# Patient Record
Sex: MALE | Race: WHITE | Employment: OTHER
[De-identification: names, ages, dates, MRNs, and addresses within clinical notes are randomized per-mention and may not be internally consistent; named-entity substitution may affect disease eponyms.]

---

## 2017-06-21 ENCOUNTER — MYAURORA ACCOUNT LINK (OUTPATIENT)
Dept: OTHER | Age: 72
End: 2017-06-21

## 2017-06-21 ENCOUNTER — PRIOR ORIGINAL RECORDS (OUTPATIENT)
Dept: OTHER | Age: 72
End: 2017-06-21

## 2017-06-22 ENCOUNTER — PRIOR ORIGINAL RECORDS (OUTPATIENT)
Dept: OTHER | Age: 72
End: 2017-06-22

## 2017-06-24 ENCOUNTER — HOSPITAL (OUTPATIENT)
Dept: OTHER | Age: 72
End: 2017-06-24
Attending: INTERNAL MEDICINE

## 2017-06-24 ENCOUNTER — PRIOR ORIGINAL RECORDS (OUTPATIENT)
Dept: OTHER | Age: 72
End: 2017-06-24

## 2017-06-24 LAB
ALBUMIN SERPL-MCNC: 3.8 GM/DL (ref 3.6–5.1)
ALBUMIN/GLOB SERPL: 1 {RATIO} (ref 1–2.4)
ALP SERPL-CCNC: 87 UNIT/L (ref 45–117)
ALT SERPL-CCNC: 40 UNIT/L
ANALYZER ANC (IANC): ABNORMAL
ANION GAP SERPL CALC-SCNC: 11 MMOL/L (ref 10–20)
AST SERPL-CCNC: 25 UNIT/L
BASOPHILS # BLD: 0 THOUSAND/MCL (ref 0–0.3)
BASOPHILS NFR BLD: 1 %
BILIRUB SERPL-MCNC: 1.2 MG/DL (ref 0.2–1)
BUN SERPL-MCNC: 19 MG/DL (ref 6–20)
BUN/CREAT SERPL: 14 (ref 7–25)
CALCIUM SERPL-MCNC: 8.5 MG/DL (ref 8.4–10.2)
CHLORIDE: 106 MMOL/L (ref 98–107)
CHOLEST SERPL-MCNC: 94 MG/DL
CHOLEST/HDLC SERPL: 1.9 {RATIO}
CK SERPL-CCNC: 235 UNIT/L (ref 39–308)
CO2 SERPL-SCNC: 27 MMOL/L (ref 21–32)
CREAT SERPL-MCNC: 1.39 MG/DL (ref 0.67–1.17)
DIFFERENTIAL METHOD BLD: ABNORMAL
EOSINOPHIL # BLD: 0.3 THOUSAND/MCL (ref 0.1–0.5)
EOSINOPHIL NFR BLD: 4 %
ERYTHROCYTE [DISTWIDTH] IN BLOOD: 13.5 % (ref 11–15)
FREE T4: 0.9 NG/DL (ref 0.8–1.5)
GLOBULIN SER-MCNC: 3.8 GM/DL (ref 2–4)
GLUCOSE SERPL-MCNC: 105 MG/DL (ref 65–99)
GLYCOHEMOGLOBIN: 6.1 % (ref 4.5–5.6)
HDLC SERPL-MCNC: 49 MG/DL
HEMATOCRIT: 44.7 % (ref 39–51)
HGB BLD-MCNC: 15.3 GM/DL (ref 13–17)
LDLC SERPL CALC-MCNC: 32 MG/DL
LENGTH OF FAST TIME PATIENT: ABNORMAL HR
LENGTH OF FAST TIME PATIENT: ABNORMAL HR
LYMPHOCYTES # BLD: 1.7 THOUSAND/MCL (ref 1–4)
LYMPHOCYTES NFR BLD: 24 %
MCH RBC QN AUTO: 30.7 PG (ref 26–34)
MCHC RBC AUTO-ENTMCNC: 34.2 GM/DL (ref 32–36.5)
MCV RBC AUTO: 89.6 FL (ref 78–100)
MONOCYTES # BLD: 1 THOUSAND/MCL (ref 0.3–0.9)
MONOCYTES NFR BLD: 14 %
NEUTROPHILS # BLD: 4 THOUSAND/MCL (ref 1.8–7.7)
NEUTROPHILS NFR BLD: 57 %
NEUTS SEG NFR BLD: ABNORMAL %
NONHDLC SERPL-MCNC: 45 MG/DL
PERCENT NRBC: ABNORMAL
PLATELET # BLD: 173 THOUSAND/MCL (ref 140–450)
POTASSIUM SERPL-SCNC: 4.3 MMOL/L (ref 3.4–5.1)
PROT SERPL-MCNC: 7.6 GM/DL (ref 6.4–8.2)
PSA SERPL-MCNC: 0.34 NG/ML
RBC # BLD: 4.99 MILLION/MCL (ref 4.5–5.9)
SODIUM SERPL-SCNC: 140 MMOL/L (ref 135–145)
TRIGLYCERIDE (TRIGP): 63 MG/DL
TSH SERPL-ACNC: 2.54 MCUNIT/ML (ref 0.35–5)
WBC # BLD: 7.1 THOUSAND/MCL (ref 4.2–11)

## 2017-06-26 LAB
ALBUMIN: 3.8 G/DL
ALKALINE PHOSPHATATE(ALK PHOS): 87 IU/L
ALT (SGPT): 40 U/L
ALT (SGPT): 40 U/L
AST (SGOT): 25 U/L
AST (SGOT): 25 U/L
BILIRUBIN TOTAL: 1.2 MG/DL
BUN: 19 MG/DL
CALCIUM: 8.5 MG/DL
CHLORIDE: 106 MEQ/L
CHOLESTEROL, TOTAL: 94 MG/DL
CREATININE KINASE: 235 U/L
CREATININE, SERUM: 1.39 MG/DL
FREE T4: 0.9 MG/DL
GLOBULIN: 3.8 G/DL
GLUCOSE: 105 MG/DL
GLUCOSE: 105 MG/DL
GLYCOHEMOGLOBIN: 6.1 %
HDL CHOLESTEROL: 49 MG/DL
HEMATOCRIT: 44.7 %
HEMOGLOBIN: 15.3 G/DL
LDL CHOLESTEROL: 32 MG/DL
NON-HDL CHOLESTEROL: 45 MG/DL
PLATELETS: 173 K/UL
POTASSIUM, SERUM: 4.3 MEQ/L
PROTEIN, TOTAL: 7.6 G/DL
RED BLOOD COUNT: 4.9 X 10-6/U
SGOT (AST): 25 IU/L
SGPT (ALT): 40 IU/L
SODIUM: 140 MEQ/L
THYROID STIMULATING HORMONE: 2.54 MLU/L
THYROID STIMULATING HORMONE: 2.54 MLU/L
TRIGLYCERIDES: 63 MG/DL
WHITE BLOOD COUNT: 7.1 X 10-3/U

## 2017-06-28 ENCOUNTER — PRIOR ORIGINAL RECORDS (OUTPATIENT)
Dept: OTHER | Age: 72
End: 2017-06-28

## 2018-01-01 ENCOUNTER — PRIOR ORIGINAL RECORDS (OUTPATIENT)
Dept: OTHER | Age: 73
End: 2018-01-01

## 2018-01-01 ENCOUNTER — HOSPITAL (OUTPATIENT)
Dept: OTHER | Age: 73
End: 2018-01-01
Attending: INTERNAL MEDICINE

## 2018-01-01 ENCOUNTER — LAB ENCOUNTER (OUTPATIENT)
Dept: LAB | Age: 73
End: 2018-01-01
Attending: PHYSICAL MEDICINE & REHABILITATION
Payer: MEDICARE

## 2018-01-01 DIAGNOSIS — Z51.81 ENCOUNTER FOR THERAPEUTIC DRUG MONITORING: Primary | ICD-10-CM

## 2018-01-01 LAB
ALBUMIN SERPL-MCNC: 2.5 GM/DL (ref 3.6–5.1)
ALBUMIN/GLOB SERPL: 0.5 {RATIO} (ref 1–2.4)
ALP SERPL-CCNC: 168 UNIT/L (ref 45–117)
ALT SERPL-CCNC: 128 UNIT/L
ANALYZER ANC (IANC): ABNORMAL
ANALYZER ANC (IANC): ABNORMAL
ANION GAP SERPL CALC-SCNC: 14 MMOL/L (ref 10–20)
ANION GAP SERPL CALC-SCNC: 17 MMOL/L (ref 10–20)
AST SERPL-CCNC: 86 UNIT/L
BASOPHILS # BLD: 0 THOUSAND/MCL (ref 0–0.3)
BASOPHILS # BLD: 0.1 THOUSAND/MCL (ref 0–0.3)
BASOPHILS NFR BLD: 0 %
BASOPHILS NFR BLD: 1 %
BILIRUB SERPL-MCNC: 1.2 MG/DL (ref 0.2–1)
BUN SERPL-MCNC: 21 MG/DL (ref 6–20)
BUN SERPL-MCNC: 22 MG/DL (ref 6–20)
BUN/CREAT SERPL: 21 (ref 7–25)
BUN/CREAT SERPL: 22 (ref 7–25)
BURR CELLS (BURC): ABNORMAL
BURR CELLS (BURC): ABNORMAL
CALCIUM SERPL-MCNC: 8.9 MG/DL (ref 8.4–10.2)
CALCIUM SERPL-MCNC: 8.9 MG/DL (ref 8.4–10.2)
CHLORIDE: 102 MMOL/L (ref 98–107)
CHLORIDE: 102 MMOL/L (ref 98–107)
CO2 SERPL-SCNC: 24 MMOL/L (ref 21–32)
CO2 SERPL-SCNC: 24 MMOL/L (ref 21–32)
CREAT SERPL-MCNC: 0.99 MG/DL (ref 0.67–1.17)
CREAT SERPL-MCNC: 1.01 MG/DL (ref 0.67–1.17)
DIFFERENTIAL METHOD BLD: ABNORMAL
DIFFERENTIAL METHOD BLD: ABNORMAL
EOSINOPHIL # BLD: 0.1 THOUSAND/MCL (ref 0.1–0.5)
EOSINOPHIL # BLD: 0.1 THOUSAND/MCL (ref 0.1–0.5)
EOSINOPHIL NFR BLD: 1 %
EOSINOPHIL NFR BLD: 1 %
ERYTHROCYTE [DISTWIDTH] IN BLOOD: 15.5 % (ref 11–15)
ERYTHROCYTE [DISTWIDTH] IN BLOOD: 15.7 % (ref 11–15)
GLOBULIN SER-MCNC: 4.8 GM/DL (ref 2–4)
GLUCOSE BLDC GLUCOMTR-MCNC: 78 MG/DL (ref 65–99)
GLUCOSE BLDC GLUCOMTR-MCNC: 84 MG/DL (ref 65–99)
GLUCOSE BLDC GLUCOMTR-MCNC: 94 MG/DL (ref 65–99)
GLUCOSE BLDC GLUCOMTR-MCNC: 95 MG/DL (ref 65–99)
GLUCOSE BLDC GLUCOMTR-MCNC: 95 MG/DL (ref 65–99)
GLUCOSE BLDC GLUCOMTR-MCNC: NORMAL MG/DL
GLUCOSE SERPL-MCNC: 100 MG/DL (ref 65–99)
GLUCOSE SERPL-MCNC: 117 MG/DL (ref 65–99)
HEMATOCRIT: 37.5 % (ref 39–51)
HEMATOCRIT: 39.6 % (ref 39–51)
HGB BLD-MCNC: 12 GM/DL (ref 13–17)
HGB BLD-MCNC: 12.4 GM/DL (ref 13–17)
INR PPP: 1.1
INR PPP: 1.1
INR PPP: NORMAL
LYMPHOCYTES # BLD: 1 THOUSAND/MCL (ref 1–4)
LYMPHOCYTES # BLD: 1.2 THOUSAND/MCL (ref 1–4)
LYMPHOCYTES NFR BLD: 10 %
LYMPHOCYTES NFR BLD: 15 %
MAGNESIUM SERPL-MCNC: 1.9 MG/DL (ref 1.7–2.4)
MCH RBC QN AUTO: 28 PG (ref 26–34)
MCH RBC QN AUTO: 28.2 PG (ref 26–34)
MCHC RBC AUTO-ENTMCNC: 31.3 GM/DL (ref 32–36.5)
MCHC RBC AUTO-ENTMCNC: 32 GM/DL (ref 32–36.5)
MCV RBC AUTO: 88 FL (ref 78–100)
MCV RBC AUTO: 89.4 FL (ref 78–100)
METAMYELOCYTES NFR BLD: 1 % (ref 0–2)
METAMYELOCYTES NFR BLD: 2 % (ref 0–2)
MONOCYTES # BLD: 0.7 THOUSAND/MCL (ref 0.3–0.9)
MONOCYTES # BLD: 0.8 THOUSAND/MCL (ref 0.3–0.9)
MONOCYTES NFR BLD: 9 %
MONOCYTES NFR BLD: 9 %
MYELOCYTES NFR BLD: 1 %
NEUTROPHILS # BLD: 5.8 THOUSAND/MCL (ref 1.8–7.7)
NEUTROPHILS # BLD: 6.7 THOUSAND/MCL (ref 1.8–7.7)
NEUTS BAND NFR BLD: 2 % (ref 0–10)
NEUTS BAND NFR BLD: 5 % (ref 0–10)
NEUTS SEG NFR BLD: 67 %
NEUTS SEG NFR BLD: 75 %
NRBC (NRBCRE): 1 /100 WBC
NRBC (NRBCRE): 1 /100 WBC
OVALOCYTES (OVALO): ABNORMAL
OVALOCYTES (OVALO): ABNORMAL
PATH REV BLD -IMP: ABNORMAL
PATH REV BLD -IMP: ABNORMAL
PLAT MORPH BLD: NORMAL
PLAT MORPH BLD: NORMAL
PLATELET # BLD: 198 THOUSAND/MCL (ref 140–450)
PLATELET # BLD: 201 THOUSAND/MCL (ref 140–450)
POTASSIUM SERPL-SCNC: 3.9 MMOL/L (ref 3.4–5.1)
POTASSIUM SERPL-SCNC: 4.3 MMOL/L (ref 3.4–5.1)
PROT SERPL-MCNC: 7.3 GM/DL (ref 6.4–8.2)
PROTHROMBIN TIME (PRT2): NORMAL
PROTHROMBIN TIME: 11.2 SEC (ref 9.7–11.8)
PROTHROMBIN TIME: 11.2 SECONDS (ref 9.7–11.8)
PROTHROMBIN TIME: NORMAL
RBC # BLD: 4.26 MILLION/MCL (ref 4.5–5.9)
RBC # BLD: 4.43 MILLION/MCL (ref 4.5–5.9)
SODIUM SERPL-SCNC: 136 MMOL/L (ref 135–145)
SODIUM SERPL-SCNC: 139 MMOL/L (ref 135–145)
VARIANT LYMPHS NFR BLD: 1 % (ref 0–5)
WBC # BLD: 8 THOUSAND/MCL (ref 4.2–11)
WBC # BLD: 8.7 THOUSAND/MCL (ref 4.2–11)
WBC MORPH BLD: NORMAL
WBC MORPH BLD: NORMAL

## 2018-01-01 PROCEDURE — 36415 COLL VENOUS BLD VENIPUNCTURE: CPT

## 2018-01-01 PROCEDURE — 85610 PROTHROMBIN TIME: CPT

## 2018-01-01 PROCEDURE — 85730 THROMBOPLASTIN TIME PARTIAL: CPT

## 2018-01-29 ENCOUNTER — PRIOR ORIGINAL RECORDS (OUTPATIENT)
Dept: OTHER | Age: 73
End: 2018-01-29

## 2018-01-29 ENCOUNTER — HOSPITAL (OUTPATIENT)
Dept: OTHER | Age: 73
End: 2018-01-29
Attending: INTERNAL MEDICINE

## 2018-01-29 LAB
ALBUMIN SERPL-MCNC: 3.7 GM/DL (ref 3.6–5.1)
ALBUMIN/GLOB SERPL: 0.9 {RATIO} (ref 1–2.4)
ALP SERPL-CCNC: 81 UNIT/L (ref 45–117)
ALT SERPL-CCNC: 38 UNIT/L
ANALYZER ANC (IANC): NORMAL
ANION GAP SERPL CALC-SCNC: 11 MMOL/L (ref 10–20)
AST SERPL-CCNC: 22 UNIT/L
BILIRUB SERPL-MCNC: 1.3 MG/DL (ref 0.2–1)
BUN SERPL-MCNC: 17 MG/DL (ref 6–20)
BUN/CREAT SERPL: 13 (ref 7–25)
CALCIUM SERPL-MCNC: 8.5 MG/DL (ref 8.4–10.2)
CHLORIDE: 105 MMOL/L (ref 98–107)
CHOLEST SERPL-MCNC: 109 MG/DL
CHOLEST/HDLC SERPL: 1.8 {RATIO}
CK SERPL-CCNC: 194 UNIT/L (ref 39–308)
CO2 SERPL-SCNC: 28 MMOL/L (ref 21–32)
CREAT 24H UR-MRATE: 58.8 MG/DL
CREAT SERPL-MCNC: 1.26 MG/DL (ref 0.67–1.17)
ERYTHROCYTE [DISTWIDTH] IN BLOOD: 13.8 % (ref 11–15)
GLOBULIN SER-MCNC: 3.9 GM/DL (ref 2–4)
GLUCOSE SERPL-MCNC: 107 MG/DL (ref 65–99)
GLYCOHEMOGLOBIN: 6.2 % (ref 4.5–5.6)
HDLC SERPL-MCNC: 60 MG/DL
HEMATOCRIT: 44.4 % (ref 39–51)
HGB BLD-MCNC: 15 GM/DL (ref 13–17)
LDLC SERPL CALC-MCNC: 34 MG/DL
LENGTH OF FAST TIME PATIENT: ABNORMAL HR
LENGTH OF FAST TIME PATIENT: NORMAL HR
MCH RBC QN AUTO: 30.4 PG (ref 26–34)
MCHC RBC AUTO-ENTMCNC: 33.8 GM/DL (ref 32–36.5)
MCV RBC AUTO: 89.9 FL (ref 78–100)
MICROALBUMIN UR-MCNC: 22.9 MG/DL
MICROALBUMIN/CREAT UR: 389.5 MCG/MG
NONHDLC SERPL-MCNC: 49 MG/DL
PLATELET # BLD: 164 THOUSAND/MCL (ref 140–450)
POTASSIUM SERPL-SCNC: 4.4 MMOL/L (ref 3.4–5.1)
PROT SERPL-MCNC: 7.6 GM/DL (ref 6.4–8.2)
RBC # BLD: 4.94 MILLION/MCL (ref 4.5–5.9)
SODIUM SERPL-SCNC: 140 MMOL/L (ref 135–145)
TRIGLYCERIDE (TRIGP): 74 MG/DL
WBC # BLD: 6.8 THOUSAND/MCL (ref 4.2–11)

## 2018-01-30 LAB
ALBUMIN: 3.7 G/DL
ALKALINE PHOSPHATATE(ALK PHOS): 81 IU/L
ALT (SGPT): 38 U/L
AST (SGOT): 22 U/L
BILIRUBIN TOTAL: 1.3 MG/DL
BUN: 17 MG/DL
CALCIUM: 8.5 MG/DL
CHLORIDE: 105 MEQ/L
CHOLESTEROL, TOTAL: 109 MG/DL
CREATININE KINASE: 194 U/L
CREATININE, SERUM: 1.26 MG/DL
GLOBULIN: 3.9 G/DL
GLUCOSE: 107 MG/DL
GLUCOSE: 107 MG/DL
GLYCOHEMOGLOBIN: 6.2 %
HDL CHOLESTEROL: 60 MG/DL
HEMATOCRIT: 44.4 %
HEMOGLOBIN: 15 G/DL
LDL CHOLESTEROL: 34 MG/DL
NON-HDL CHOLESTEROL: 49 MG/DL
PLATELETS: 164 K/UL
POTASSIUM, SERUM: 4.4 MEQ/L
PROTEIN, TOTAL: 7.6 G/DL
RED BLOOD COUNT: 4.94 X 10-6/U
SGOT (AST): 22 IU/L
SGPT (ALT): 38 IU/L
SODIUM: 140 MEQ/L
TRIGLYCERIDES: 74 MG/DL
WHITE BLOOD COUNT: 6.8 X 10-3/U

## 2018-01-31 ENCOUNTER — PRIOR ORIGINAL RECORDS (OUTPATIENT)
Dept: OTHER | Age: 73
End: 2018-01-31

## 2018-03-13 ENCOUNTER — HOSPITAL (OUTPATIENT)
Dept: OTHER | Age: 73
End: 2018-03-13
Attending: THORACIC SURGERY (CARDIOTHORACIC VASCULAR SURGERY)

## 2018-09-29 ENCOUNTER — HOSPITAL (OUTPATIENT)
Dept: OTHER | Age: 73
End: 2018-09-29
Attending: INTERNAL MEDICINE

## 2018-09-29 ENCOUNTER — PRIOR ORIGINAL RECORDS (OUTPATIENT)
Dept: OTHER | Age: 73
End: 2018-09-29

## 2018-09-29 LAB
ALBUMIN SERPL-MCNC: 3.9 GM/DL (ref 3.6–5.1)
ALBUMIN/GLOB SERPL: 1 {RATIO} (ref 1–2.4)
ALP SERPL-CCNC: 95 UNIT/L (ref 45–117)
ALT SERPL-CCNC: 49 UNIT/L
ANALYZER ANC (IANC): NORMAL
ANION GAP SERPL CALC-SCNC: 13 MMOL/L (ref 10–20)
AST SERPL-CCNC: 31 UNIT/L
BILIRUB SERPL-MCNC: 1.2 MG/DL (ref 0.2–1)
BUN SERPL-MCNC: 17 MG/DL (ref 6–20)
BUN/CREAT SERPL: 13 (ref 7–25)
CALCIUM SERPL-MCNC: 8.7 MG/DL (ref 8.4–10.2)
CHLORIDE: 106 MMOL/L (ref 98–107)
CHOLEST SERPL-MCNC: 102 MG/DL
CHOLEST/HDLC SERPL: 1.6 {RATIO}
CK SERPL-CCNC: 208 UNIT/L (ref 39–308)
CO2 SERPL-SCNC: 28 MMOL/L (ref 21–32)
CREAT SERPL-MCNC: 1.27 MG/DL (ref 0.67–1.17)
ERYTHROCYTE [DISTWIDTH] IN BLOOD: 14 % (ref 11–15)
GLOBULIN SER-MCNC: 4 GM/DL (ref 2–4)
GLUCOSE SERPL-MCNC: 102 MG/DL (ref 65–99)
GLYCOHEMOGLOBIN: 6.4 % (ref 4.5–5.6)
HDLC SERPL-MCNC: 62 MG/DL
HEMATOCRIT: 45.2 % (ref 39–51)
HGB BLD-MCNC: 14.8 GM/DL (ref 13–17)
LDLC SERPL CALC-MCNC: 30 MG/DL
LENGTH OF FAST TIME PATIENT: ABNORMAL HR
LENGTH OF FAST TIME PATIENT: NORMAL HR
MCH RBC QN AUTO: 29.4 PG (ref 26–34)
MCHC RBC AUTO-ENTMCNC: 32.7 GM/DL (ref 32–36.5)
MCV RBC AUTO: 89.9 FL (ref 78–100)
NONHDLC SERPL-MCNC: 40 MG/DL
NRBC (NRBCRE): NORMAL
PLATELET # BLD: 182 THOUSAND/MCL (ref 140–450)
POTASSIUM SERPL-SCNC: 4.5 MMOL/L (ref 3.4–5.1)
PROT SERPL-MCNC: 7.9 GM/DL (ref 6.4–8.2)
RBC # BLD: 5.03 MILLION/MCL (ref 4.5–5.9)
SODIUM SERPL-SCNC: 142 MMOL/L (ref 135–145)
TRIGLYCERIDE (TRIGP): 52 MG/DL
TSH SERPL-ACNC: 2.22 MCUNIT/ML (ref 0.35–5)
WBC # BLD: 6.6 THOUSAND/MCL (ref 4.2–11)

## 2018-10-01 LAB
HEMATOCRIT: 45.2 %
HEMOGLOBIN: 14.8 G/DL
PLATELETS: 182 K/UL
RED BLOOD COUNT: 5.03 X 10-6/U
WHITE BLOOD COUNT: 6.6 X 10-3/U

## 2018-10-02 LAB
ALBUMIN: 3.9 G/DL
ALKALINE PHOSPHATATE(ALK PHOS): 95 IU/L
ALT (SGPT): 49 U/L
AST (SGOT): 31 U/L
BILIRUBIN TOTAL: 1.2 MG/DL
BUN: 17 MG/DL
CALCIUM: 8.7 MG/DL
CHLORIDE: 106 MEQ/L
CHOLESTEROL, TOTAL: 102 MG/DL
CREATININE KINASE: 208 U/L
CREATININE, SERUM: 1.27 MG/DL
GLOBULIN: 4 G/DL
GLUCOSE: 102 MG/DL
GLUCOSE: 102 MG/DL
HDL CHOLESTEROL: 30 MG/DL
HEMOGLOBIN A1C: 6.4 %
LDL CHOLESTEROL: 62 MG/DL
NON-HDL CHOLESTEROL: 40 MG/DL
POTASSIUM, SERUM: 4.5 MEQ/L
PROTEIN, TOTAL: 7.9 G/DL
SGOT (AST): 31 IU/L
SGPT (ALT): 49 IU/L
SODIUM: 142 MEQ/L
THYROID STIMULATING HORMONE: 3.22 MLU/L
TRIGLYCERIDES: 52 MG/DL

## 2018-10-03 ENCOUNTER — PRIOR ORIGINAL RECORDS (OUTPATIENT)
Dept: OTHER | Age: 73
End: 2018-10-03

## 2018-10-03 ENCOUNTER — MYAURORA ACCOUNT LINK (OUTPATIENT)
Dept: OTHER | Age: 73
End: 2018-10-03

## 2018-10-13 ENCOUNTER — HOSPITAL ENCOUNTER (OUTPATIENT)
Age: 73
Discharge: HOME | End: 2018-10-13
Attending: FAMILY MEDICINE
Payer: MEDICARE

## 2018-10-13 ENCOUNTER — APPOINTMENT (OUTPATIENT)
Dept: GENERAL RADIOLOGY | Age: 73
End: 2018-10-13
Attending: FAMILY MEDICINE
Payer: MEDICARE

## 2018-10-13 VITALS
HEART RATE: 79 BPM | RESPIRATION RATE: 20 BRPM | OXYGEN SATURATION: 95 % | TEMPERATURE: 98 F | BODY MASS INDEX: 43 KG/M2 | WEIGHT: 315 LBS

## 2018-10-13 DIAGNOSIS — R91.1 LUNG NODULE: ICD-10-CM

## 2018-10-13 DIAGNOSIS — J44.1 COPD EXACERBATION (HCC): Primary | ICD-10-CM

## 2018-10-13 PROCEDURE — 99214 OFFICE O/P EST MOD 30 MIN: CPT

## 2018-10-13 PROCEDURE — 99213 OFFICE O/P EST LOW 20 MIN: CPT

## 2018-10-13 PROCEDURE — 71046 X-RAY EXAM CHEST 2 VIEWS: CPT | Performed by: FAMILY MEDICINE

## 2018-10-13 RX ORDER — METOPROLOL SUCCINATE 100 MG/1
100 TABLET, EXTENDED RELEASE ORAL DAILY
Status: ON HOLD | COMMUNITY
End: 2019-01-01

## 2018-10-13 RX ORDER — CLOPIDOGREL BISULFATE 75 MG/1
75 TABLET ORAL DAILY
COMMUNITY
End: 2019-01-01

## 2018-10-13 RX ORDER — ALBUTEROL SULFATE 90 UG/1
2 AEROSOL, METERED RESPIRATORY (INHALATION) 4 TIMES DAILY
Qty: 1 INHALER | Refills: 0 | Status: SHIPPED | OUTPATIENT
Start: 2018-10-13 | End: 2018-10-27

## 2018-10-13 RX ORDER — LISINOPRIL 5 MG/1
5 TABLET ORAL DAILY
COMMUNITY
End: 2019-01-01

## 2018-10-13 RX ORDER — AZITHROMYCIN 250 MG/1
TABLET, FILM COATED ORAL
Qty: 1 PACKAGE | Refills: 0 | Status: SHIPPED | OUTPATIENT
Start: 2018-10-13 | End: 2019-01-01 | Stop reason: ALTCHOICE

## 2018-10-13 RX ORDER — EZETIMIBE 10 MG/1
10 TABLET ORAL NIGHTLY
Status: ON HOLD | COMMUNITY
End: 2019-12-07

## 2018-10-13 RX ORDER — CODEINE PHOSPHATE AND GUAIFENESIN 10; 100 MG/5ML; MG/5ML
10 SOLUTION ORAL 4 TIMES DAILY PRN
Qty: 200 ML | Refills: 0 | Status: SHIPPED | OUTPATIENT
Start: 2018-10-13 | End: 2018-10-18

## 2018-10-13 RX ORDER — ATORVASTATIN CALCIUM 80 MG/1
80 TABLET, FILM COATED ORAL NIGHTLY
Status: ON HOLD | COMMUNITY
End: 2019-12-07

## 2018-10-13 RX ORDER — AMLODIPINE BESYLATE 10 MG/1
10 TABLET ORAL DAILY
COMMUNITY
End: 2019-01-01

## 2018-10-13 RX ORDER — METHYLPREDNISOLONE 4 MG/1
TABLET ORAL
Qty: 1 PACKAGE | Refills: 0 | Status: SHIPPED | OUTPATIENT
Start: 2018-10-13 | End: 2018-10-18

## 2018-10-13 NOTE — ED PROVIDER NOTES
Patient presents with:  Cough/URI      HPI:     Danuta Sigala is a 68year old male who presents with for chief complaint of sore throat, chest congestion, cough with productive mucus.     X 2 weeks  Getting worse  Per patient he has a history of COPD an findings: mild oropharyngeal erythema  Neck: no adenopathy  RESPIRATORY:   Lungs: clear to auscultation bilaterally. No chest wall retractions. No respiratory distress.  No tachypnea noted  CARDIOVASCULAR:   Heart: S1, S2 normal, no murmur, click, rub or ga Lung mass R91.8      Highly recommended to follow with PCP for abnormal chest x-ray showing lung nodule. See medications below.     Orders Placed This Encounter      XR CHEST PA + LAT CHEST (CPT=71046) Once          Order Specific Question: What is the R

## 2018-10-13 NOTE — ED INITIAL ASSESSMENT (HPI)
Cold like symptoms at home for 2 weeks. Pt states he started with sore throat and now has a cough and congestion. Sweats at night.

## 2018-10-25 ENCOUNTER — APPOINTMENT (OUTPATIENT)
Dept: ENDOCRINOLOGY | Facility: HOSPITAL | Age: 73
End: 2018-10-25
Attending: INTERNAL MEDICINE
Payer: MEDICARE

## 2018-11-02 ENCOUNTER — HOSPITAL (OUTPATIENT)
Dept: OTHER | Age: 73
End: 2018-11-02
Attending: INTERNAL MEDICINE

## 2018-11-07 ENCOUNTER — PRIOR ORIGINAL RECORDS (OUTPATIENT)
Dept: OTHER | Age: 73
End: 2018-11-07

## 2018-11-13 ENCOUNTER — IMAGING SERVICES (OUTPATIENT)
Dept: OTHER | Age: 73
End: 2018-11-13

## 2018-11-13 ENCOUNTER — HOSPITAL (OUTPATIENT)
Dept: OTHER | Age: 73
End: 2018-11-13
Attending: INTERNAL MEDICINE

## 2018-11-13 ENCOUNTER — PRIOR ORIGINAL RECORDS (OUTPATIENT)
Dept: OTHER | Age: 73
End: 2018-11-13

## 2018-11-26 ENCOUNTER — LAB SERVICES (OUTPATIENT)
Dept: OTHER | Age: 73
End: 2018-11-26

## 2018-11-26 ENCOUNTER — HOSPITAL (OUTPATIENT)
Dept: OTHER | Age: 73
End: 2018-11-26
Attending: INTERNAL MEDICINE

## 2018-11-26 LAB
ANALYZER ANC (IANC): NORMAL
ANALYZER ANC (IANC): NORMAL
ERYTHROCYTE [DISTWIDTH] IN BLOOD: 14.4 % (ref 11–15)
ERYTHROCYTE [DISTWIDTH] IN BLOOD: 14.4 % (ref 11–15)
HCT VFR BLD CALC: 39.8 % (ref 39–51)
HEMATOCRIT: 39.8 % (ref 39–51)
HGB BLD-MCNC: 13.3 G/DL (ref 13–17)
HGB BLD-MCNC: 13.3 GM/DL (ref 13–17)
INR PPP: 1
INR PPP: 1
INR PPP: NORMAL
MCH RBC QN AUTO: 28.6 PG (ref 26–34)
MCH RBC QN AUTO: 28.6 PG (ref 26–34)
MCHC RBC AUTO-ENTMCNC: 33.4 G/DL (ref 32–36.5)
MCHC RBC AUTO-ENTMCNC: 33.4 GM/DL (ref 32–36.5)
MCV RBC AUTO: 85.6 FL (ref 78–100)
MCV RBC AUTO: 85.6 FL (ref 78–100)
NRBC (NRBCRE): NORMAL
NRBC (NRBCRE): NORMAL
PLATELET # BLD: 175 K/MCL (ref 140–450)
PLATELET # BLD: 175 THOUSAND/MCL (ref 140–450)
PROTHROMBIN TIME (PRT2): NORMAL
PROTHROMBIN TIME: 10.5 SEC (ref 9.7–11.8)
PROTHROMBIN TIME: 10.5 SECONDS (ref 9.7–11.8)
PROTHROMBIN TIME: NORMAL
RBC # BLD: 4.65 MIL/MCL (ref 4.5–5.9)
RBC # BLD: 4.65 MILLION/MCL (ref 4.5–5.9)
WBC # BLD: 7.2 K/MCL (ref 4.2–11)
WBC # BLD: 7.2 THOUSAND/MCL (ref 4.2–11)

## 2018-11-28 ENCOUNTER — PRIOR ORIGINAL RECORDS (OUTPATIENT)
Dept: OTHER | Age: 73
End: 2018-11-28

## 2018-12-03 ENCOUNTER — HOSPITAL (OUTPATIENT)
Dept: OTHER | Age: 73
End: 2018-12-03
Attending: RADIOLOGY

## 2018-12-03 ENCOUNTER — HOSPITAL (OUTPATIENT)
Dept: OTHER | Age: 73
End: 2018-12-03
Attending: EMERGENCY MEDICINE

## 2018-12-03 LAB
ALBUMIN SERPL-MCNC: 2.8 GM/DL (ref 3.6–5.1)
ALBUMIN/GLOB SERPL: 0.6 {RATIO} (ref 1–2.4)
ALP SERPL-CCNC: 122 UNIT/L (ref 45–117)
ALT SERPL-CCNC: 44 UNIT/L
ANALYZER ANC (IANC): ABNORMAL
ANION GAP SERPL CALC-SCNC: 18 MMOL/L (ref 10–20)
AST SERPL-CCNC: 50 UNIT/L
BASOPHILS # BLD: 0 THOUSAND/MCL (ref 0–0.3)
BASOPHILS NFR BLD: 0 %
BILIRUB SERPL-MCNC: 1.3 MG/DL (ref 0.2–1)
BUN SERPL-MCNC: 30 MG/DL (ref 6–20)
BUN/CREAT SERPL: 25 (ref 7–25)
CALCIUM SERPL-MCNC: 8.9 MG/DL (ref 8.4–10.2)
CHLORIDE: 101 MMOL/L (ref 98–107)
CO2 SERPL-SCNC: 21 MMOL/L (ref 21–32)
CREAT SERPL-MCNC: 1.21 MG/DL (ref 0.67–1.17)
DIFFERENTIAL METHOD BLD: ABNORMAL
EOSINOPHIL # BLD: 0 THOUSAND/MCL (ref 0.1–0.5)
EOSINOPHIL NFR BLD: 0 %
ERYTHROCYTE [DISTWIDTH] IN BLOOD: 14.7 % (ref 11–15)
GLOBULIN SER-MCNC: 4.6 GM/DL (ref 2–4)
GLUCOSE BLDC GLUCOMTR-MCNC: 97 MG/DL (ref 65–99)
GLUCOSE BLDC GLUCOMTR-MCNC: 97 MG/DL (ref 65–99)
GLUCOSE BLDC GLUCOMTR-MCNC: NORMAL MG/DL
GLUCOSE SERPL-MCNC: 94 MG/DL (ref 65–99)
HCT VFR BLD CALC: 39.8 % (ref 39–51)
HEMATOCRIT: 38.8 % (ref 39–51)
HEMATOCRIT: 39.8 % (ref 39–51)
HGB BLD-MCNC: 12.6 GM/DL (ref 13–17)
HGB BLD-MCNC: 12.8 G/DL (ref 13–17)
HGB BLD-MCNC: 12.8 GM/DL (ref 13–17)
IMM GRANULOCYTES # BLD AUTO: 0.3 THOUSAND/MCL (ref 0–0.2)
IMM GRANULOCYTES NFR BLD: 3 %
INR PPP: 1
INR PPP: 1
INR PPP: NORMAL
LYMPHOCYTES # BLD: 1.2 THOUSAND/MCL (ref 1–4)
LYMPHOCYTES NFR BLD: 14 %
MCH RBC QN AUTO: 28 PG (ref 26–34)
MCH RBC QN AUTO: 28 PG (ref 26–34)
MCH RBC QN AUTO: 28.2 PG (ref 26–34)
MCHC RBC AUTO-ENTMCNC: 32.2 G/DL (ref 32–36.5)
MCHC RBC AUTO-ENTMCNC: 32.2 GM/DL (ref 32–36.5)
MCHC RBC AUTO-ENTMCNC: 32.5 GM/DL (ref 32–36.5)
MCV RBC AUTO: 86.8 FL (ref 78–100)
MCV RBC AUTO: 87.1 FL (ref 78–100)
MCV RBC AUTO: 87.1 FL (ref 78–100)
MONOCYTES # BLD: 1.3 THOUSAND/MCL (ref 0.3–0.9)
MONOCYTES NFR BLD: 15 %
NEUTROPHILS # BLD: 5.8 THOUSAND/MCL (ref 1.8–7.7)
NEUTROPHILS NFR BLD: 68 %
NEUTS SEG NFR BLD: ABNORMAL %
NRBC (NRBCRE): 0 /100 WBC
PLATELET # BLD: 177 THOUSAND/MCL (ref 140–450)
PLATELET # BLD: 195 K/MCL (ref 140–450)
PLATELET # BLD: 195 THOUSAND/MCL (ref 140–450)
POTASSIUM SERPL-SCNC: 4 MMOL/L (ref 3.4–5.1)
PROT SERPL-MCNC: 7.4 GM/DL (ref 6.4–8.2)
PROTHROMBIN TIME (PRT2): NORMAL
PROTHROMBIN TIME: 10.7 SEC (ref 9.7–11.8)
PROTHROMBIN TIME: 10.7 SECONDS (ref 9.7–11.8)
PROTHROMBIN TIME: NORMAL
RBC # BLD: 4.47 MILLION/MCL (ref 4.5–5.9)
RBC # BLD: 4.57 MIL/MCL (ref 4.5–5.9)
RBC # BLD: 4.57 MILLION/MCL (ref 4.5–5.9)
SODIUM SERPL-SCNC: 136 MMOL/L (ref 135–145)
WBC # BLD: 8.7 THOUSAND/MCL (ref 4.2–11)
WBC # BLD: 9.7 K/MCL (ref 4.2–11)
WBC # BLD: 9.7 THOUSAND/MCL (ref 4.2–11)

## 2018-12-05 LAB — PATH REPORT, FNA: NORMAL

## 2019-01-01 ENCOUNTER — PRIOR ORIGINAL RECORDS (OUTPATIENT)
Dept: OTHER | Age: 74
End: 2019-01-01

## 2019-01-01 ENCOUNTER — HOSPITAL (OUTPATIENT)
Dept: OTHER | Age: 74
End: 2019-01-01

## 2019-01-01 ENCOUNTER — APPOINTMENT (OUTPATIENT)
Dept: GENERAL RADIOLOGY | Facility: HOSPITAL | Age: 74
DRG: 312 | End: 2019-01-01
Attending: EMERGENCY MEDICINE
Payer: MEDICARE

## 2019-01-01 ENCOUNTER — HOSPITAL (OUTPATIENT)
Dept: OTHER | Age: 74
End: 2019-01-01
Attending: INTERNAL MEDICINE

## 2019-01-01 ENCOUNTER — APPOINTMENT (OUTPATIENT)
Dept: CT IMAGING | Facility: HOSPITAL | Age: 74
DRG: 064 | End: 2019-01-01
Attending: EMERGENCY MEDICINE
Payer: MEDICARE

## 2019-01-01 ENCOUNTER — APPOINTMENT (OUTPATIENT)
Dept: CARDIOLOGY | Age: 74
End: 2019-01-01

## 2019-01-01 ENCOUNTER — TELEPHONE (OUTPATIENT)
Dept: CARDIOLOGY | Age: 74
End: 2019-01-01

## 2019-01-01 ENCOUNTER — MED REC SCAN ONLY (OUTPATIENT)
Dept: NEUROLOGY | Facility: CLINIC | Age: 74
End: 2019-01-01

## 2019-01-01 ENCOUNTER — DIAGNOSTIC TRANS (OUTPATIENT)
Dept: OTHER | Age: 74
End: 2019-01-01

## 2019-01-01 ENCOUNTER — HOSPITAL (OUTPATIENT)
Dept: OTHER | Age: 74
End: 2019-01-01
Attending: RADIOLOGY

## 2019-01-01 ENCOUNTER — HOSPITAL (OUTPATIENT)
Dept: OTHER | Age: 74
End: 2019-01-01
Attending: NEUROLOGICAL SURGERY

## 2019-01-01 ENCOUNTER — EXTERNAL RECORD (OUTPATIENT)
Dept: HEALTH INFORMATION MANAGEMENT | Facility: OTHER | Age: 74
End: 2019-01-01

## 2019-01-01 ENCOUNTER — OFFICE VISIT (OUTPATIENT)
Dept: NEUROSURGERY | Age: 74
End: 2019-01-01

## 2019-01-01 ENCOUNTER — HOSPITAL ENCOUNTER (INPATIENT)
Facility: HOSPITAL | Age: 74
LOS: 3 days | Discharge: HOME | DRG: 312 | End: 2019-01-01
Attending: EMERGENCY MEDICINE | Admitting: INTERNAL MEDICINE
Payer: MEDICARE

## 2019-01-01 ENCOUNTER — NURSING HOME VISIT (OUTPATIENT)
Dept: POST ACUTE CARE | Age: 74
End: 2019-01-01

## 2019-01-01 ENCOUNTER — APPOINTMENT (OUTPATIENT)
Dept: GENERAL RADIOLOGY | Facility: HOSPITAL | Age: 74
DRG: 064 | End: 2019-01-01
Attending: EMERGENCY MEDICINE
Payer: MEDICARE

## 2019-01-01 ENCOUNTER — APPOINTMENT (OUTPATIENT)
Dept: ULTRASOUND IMAGING | Facility: HOSPITAL | Age: 74
DRG: 312 | End: 2019-01-01
Attending: INTERNAL MEDICINE
Payer: MEDICARE

## 2019-01-01 ENCOUNTER — HOSPITAL ENCOUNTER (INPATIENT)
Facility: HOSPITAL | Age: 74
LOS: 1 days | DRG: 064 | End: 2019-01-01
Attending: EMERGENCY MEDICINE | Admitting: INTERNAL MEDICINE
Payer: MEDICARE

## 2019-01-01 ENCOUNTER — TELEPHONE (OUTPATIENT)
Dept: NEUROSURGERY | Age: 74
End: 2019-01-01

## 2019-01-01 ENCOUNTER — APPOINTMENT (OUTPATIENT)
Dept: CV DIAGNOSTICS | Facility: HOSPITAL | Age: 74
DRG: 312 | End: 2019-01-01
Attending: INTERNAL MEDICINE
Payer: MEDICARE

## 2019-01-01 ENCOUNTER — APPOINTMENT (OUTPATIENT)
Dept: CT IMAGING | Facility: HOSPITAL | Age: 74
DRG: 312 | End: 2019-01-01
Attending: EMERGENCY MEDICINE
Payer: MEDICARE

## 2019-01-01 ENCOUNTER — OFFICE VISIT (OUTPATIENT)
Dept: CARDIOLOGY | Age: 74
End: 2019-01-01

## 2019-01-01 ENCOUNTER — LAB SERVICES (OUTPATIENT)
Dept: CARDIOLOGY | Age: 74
End: 2019-01-01

## 2019-01-01 ENCOUNTER — APPOINTMENT (OUTPATIENT)
Dept: CARDIOLOGY | Age: 74
End: 2019-01-01
Attending: INTERNAL MEDICINE

## 2019-01-01 VITALS
BODY MASS INDEX: 44.1 KG/M2 | HEIGHT: 71 IN | DIASTOLIC BLOOD PRESSURE: 90 MMHG | HEART RATE: 72 BPM | RESPIRATION RATE: 16 BRPM | WEIGHT: 315 LBS | SYSTOLIC BLOOD PRESSURE: 156 MMHG

## 2019-01-01 VITALS
DIASTOLIC BLOOD PRESSURE: 92 MMHG | HEART RATE: 86 BPM | BODY MASS INDEX: 37.4 KG/M2 | TEMPERATURE: 98 F | OXYGEN SATURATION: 96 % | RESPIRATION RATE: 18 BRPM | WEIGHT: 282.19 LBS | SYSTOLIC BLOOD PRESSURE: 147 MMHG | HEIGHT: 73 IN

## 2019-01-01 VITALS
DIASTOLIC BLOOD PRESSURE: 63 MMHG | WEIGHT: 282.19 LBS | BODY MASS INDEX: 37 KG/M2 | RESPIRATION RATE: 42 BRPM | HEART RATE: 76 BPM | TEMPERATURE: 101 F | SYSTOLIC BLOOD PRESSURE: 132 MMHG | OXYGEN SATURATION: 95 %

## 2019-01-01 VITALS
HEART RATE: 64 BPM | DIASTOLIC BLOOD PRESSURE: 80 MMHG | SYSTOLIC BLOOD PRESSURE: 142 MMHG | HEIGHT: 71 IN | WEIGHT: 296 LBS | BODY MASS INDEX: 41.44 KG/M2

## 2019-01-01 VITALS
DIASTOLIC BLOOD PRESSURE: 86 MMHG | SYSTOLIC BLOOD PRESSURE: 148 MMHG | RESPIRATION RATE: 18 BRPM | HEIGHT: 71 IN | BODY MASS INDEX: 44.1 KG/M2 | WEIGHT: 315 LBS | HEART RATE: 68 BPM

## 2019-01-01 VITALS
HEIGHT: 71 IN | HEART RATE: 76 BPM | RESPIRATION RATE: 16 BRPM | SYSTOLIC BLOOD PRESSURE: 130 MMHG | DIASTOLIC BLOOD PRESSURE: 80 MMHG | WEIGHT: 315 LBS | BODY MASS INDEX: 44.1 KG/M2

## 2019-01-01 VITALS
DIASTOLIC BLOOD PRESSURE: 81 MMHG | BODY MASS INDEX: 37.64 KG/M2 | SYSTOLIC BLOOD PRESSURE: 143 MMHG | OXYGEN SATURATION: 97 % | HEIGHT: 73 IN | HEART RATE: 59 BPM | WEIGHT: 284 LBS

## 2019-01-01 DIAGNOSIS — C34.90 METASTATIC NON-SMALL CELL LUNG CANCER (CMD): ICD-10-CM

## 2019-01-01 DIAGNOSIS — E83.51 HYPOCALCEMIA: ICD-10-CM

## 2019-01-01 DIAGNOSIS — R53.81 DEBILITY: Primary | ICD-10-CM

## 2019-01-01 DIAGNOSIS — R19.7 DIARRHEA, UNSPECIFIED TYPE: ICD-10-CM

## 2019-01-01 DIAGNOSIS — N18.9 CHRONIC RENAL IMPAIRMENT, UNSPECIFIED CKD STAGE: ICD-10-CM

## 2019-01-01 DIAGNOSIS — E78.5 DYSLIPIDEMIA: ICD-10-CM

## 2019-01-01 DIAGNOSIS — D69.6 THROMBOCYTOPENIA (HCC): ICD-10-CM

## 2019-01-01 DIAGNOSIS — I10 ESSENTIAL HYPERTENSION: ICD-10-CM

## 2019-01-01 DIAGNOSIS — J44.9 CHRONIC OBSTRUCTIVE PULMONARY DISEASE, UNSPECIFIED COPD TYPE (CMD): ICD-10-CM

## 2019-01-01 DIAGNOSIS — R41.0 DELIRIUM: Primary | ICD-10-CM

## 2019-01-01 DIAGNOSIS — E78.00 PURE HYPERCHOLESTEROLEMIA: Primary | ICD-10-CM

## 2019-01-01 DIAGNOSIS — G47.33 OSA (OBSTRUCTIVE SLEEP APNEA): ICD-10-CM

## 2019-01-01 DIAGNOSIS — I65.23 BILATERAL CAROTID ARTERY STENOSIS: ICD-10-CM

## 2019-01-01 DIAGNOSIS — C34.90 PRIMARY MALIGNANT NEOPLASM OF LUNG METASTATIC TO OTHER SITE, UNSPECIFIED LATERALITY (HCC): ICD-10-CM

## 2019-01-01 DIAGNOSIS — R73.9 HYPERGLYCEMIA: ICD-10-CM

## 2019-01-01 DIAGNOSIS — I69.314 FRONTAL LOBE AND EXECUTIVE FUNCTION DEFICIT FOLLOWING CEREBRAL INFARCTION: ICD-10-CM

## 2019-01-01 DIAGNOSIS — I10 HYPERTENSION, UNSPECIFIED TYPE: ICD-10-CM

## 2019-01-01 DIAGNOSIS — R55 SYNCOPE, UNSPECIFIED SYNCOPE TYPE: Primary | ICD-10-CM

## 2019-01-01 DIAGNOSIS — G45.9 TIA (TRANSIENT ISCHEMIC ATTACK): ICD-10-CM

## 2019-01-01 DIAGNOSIS — G91.9 HYDROCEPHALUS (CMD): Primary | ICD-10-CM

## 2019-01-01 DIAGNOSIS — D64.9 ANEMIA, UNSPECIFIED TYPE: ICD-10-CM

## 2019-01-01 DIAGNOSIS — I63.9 CEREBROVASCULAR ACCIDENT (CVA), UNSPECIFIED MECHANISM (CMD): Primary | ICD-10-CM

## 2019-01-01 LAB
2009 H1N1 SUBTYPE (RF1N1): NOT DETECTED
ABSOLUTE IMMATURE GRANULOCYTES (OFFPRE24): NORMAL
ACANTHOCYTES (ACANT): ABNORMAL
ADENOVIRUS (RADENO): NOT DETECTED
ALBUMIN SERPL-MCNC: 1.7 GM/DL (ref 3.6–5.1)
ALBUMIN SERPL-MCNC: 1.9 GM/DL (ref 3.6–5.1)
ALBUMIN SERPL-MCNC: 2 GM/DL (ref 3.6–5.1)
ALBUMIN SERPL-MCNC: 2 GM/DL (ref 3.6–5.1)
ALBUMIN SERPL-MCNC: 2.4 GM/DL (ref 3.6–5.1)
ALBUMIN SERPL-MCNC: 2.7 G/DL (ref 3.6–5.1)
ALBUMIN SERPL-MCNC: 2.7 G/DL (ref 3.6–5.1)
ALBUMIN SERPL-MCNC: 2.8 G/DL (ref 3.6–5.1)
ALBUMIN SERPL-MCNC: 2.8 GM/DL (ref 3.6–5.1)
ALBUMIN SERPL-MCNC: 2.8 GM/DL (ref 3.6–5.1)
ALBUMIN SERPL-MCNC: 2.9 G/DL (ref 3.1–4.5)
ALBUMIN SERPL-MCNC: 2.9 G/DL (ref 3.6–5.1)
ALBUMIN SERPL-MCNC: 2.9 GM/DL (ref 3.6–5.1)
ALBUMIN SERPL-MCNC: 2.9 GM/DL (ref 3.6–5.1)
ALBUMIN SERPL-MCNC: 3 G/DL (ref 3.6–5.1)
ALBUMIN SERPL-MCNC: 3 G/DL (ref 3.6–5.1)
ALBUMIN SERPL-MCNC: 3.1 GM/DL (ref 3.6–5.1)
ALBUMIN SERPL-MCNC: 3.3 G/DL (ref 3.6–5.1)
ALBUMIN SERPL-MCNC: 3.4 G/DL (ref 3.6–5.1)
ALBUMIN SERPL-MCNC: 3.6 G/DL (ref 3.6–5.1)
ALBUMIN SERPL-MCNC: 3.6 G/DL (ref 3.6–5.1)
ALBUMIN SERPL-MCNC: 3.7 G/DL (ref 3.6–5.1)
ALBUMIN SERPL-MCNC: 3.9 G/DL
ALBUMIN/GLOB SERPL: 0.4 {RATIO} (ref 1–2.4)
ALBUMIN/GLOB SERPL: 0.5 {RATIO} (ref 1–2.4)
ALBUMIN/GLOB SERPL: 0.6 {RATIO} (ref 1–2.4)
ALBUMIN/GLOB SERPL: 0.8 {RATIO} (ref 1–2.4)
ALBUMIN/GLOB SERPL: 0.8 {RATIO} (ref 1–2.4)
ALBUMIN/GLOB SERPL: 0.9 {RATIO} (ref 1–2)
ALBUMIN/GLOB SERPL: 0.9 {RATIO} (ref 1–2.4)
ALBUMIN/GLOB SERPL: 1 {RATIO} (ref 1–2.4)
ALBUMIN/GLOB SERPL: 1.1 {RATIO} (ref 1–2.4)
ALBUMIN/GLOB SERPL: 1.2 {RATIO} (ref 1–2.4)
ALBUMIN/GLOB SERPL: NORMAL {RATIO}
ALP LIVER SERPL-CCNC: 801 U/L (ref 45–117)
ALP SERPL-CCNC: 103 UNITS/L (ref 45–117)
ALP SERPL-CCNC: 104 UNITS/L (ref 45–117)
ALP SERPL-CCNC: 115 UNITS/L (ref 45–117)
ALP SERPL-CCNC: 116 UNITS/L (ref 45–117)
ALP SERPL-CCNC: 117 UNIT/L (ref 45–117)
ALP SERPL-CCNC: 119 UNIT/L (ref 45–117)
ALP SERPL-CCNC: 121 UNITS/L (ref 45–117)
ALP SERPL-CCNC: 124 UNITS/L (ref 45–117)
ALP SERPL-CCNC: 130 UNIT/L (ref 45–117)
ALP SERPL-CCNC: 130 UNITS/L (ref 45–117)
ALP SERPL-CCNC: 150 UNITS/L (ref 45–117)
ALP SERPL-CCNC: 161 UNIT/L (ref 45–117)
ALP SERPL-CCNC: 168 U/L
ALP SERPL-CCNC: 176 UNIT/L (ref 45–117)
ALP SERPL-CCNC: 201 UNIT/L (ref 45–117)
ALP SERPL-CCNC: 202 UNIT/L (ref 45–117)
ALP SERPL-CCNC: 618 UNIT/L (ref 45–117)
ALP SERPL-CCNC: 640 UNIT/L (ref 45–117)
ALP SERPL-CCNC: 667 UNIT/L (ref 45–117)
ALP SERPL-CCNC: 689 UNIT/L (ref 45–117)
ALP SERPL-CCNC: 712 UNIT/L (ref 45–117)
ALP SERPL-CCNC: 91 UNITS/L (ref 45–117)
ALP SERPL-CCNC: 95 UNITS/L (ref 45–117)
ALP SERPL-CCNC: 99 UNITS/L (ref 45–117)
ALT SERPL-CCNC: 118 UNIT/L
ALT SERPL-CCNC: 14 U/L (ref 17–63)
ALT SERPL-CCNC: 144 UNIT/L
ALT SERPL-CCNC: 145 UNIT/L
ALT SERPL-CCNC: 157 UNIT/L
ALT SERPL-CCNC: 16 UNIT/L
ALT SERPL-CCNC: 18 U/L
ALT SERPL-CCNC: 19 UNIT/L
ALT SERPL-CCNC: 19 UNIT/L
ALT SERPL-CCNC: 21 UNIT/L
ALT SERPL-CCNC: 21 UNIT/L
ALT SERPL-CCNC: 23 UNITS/L
ALT SERPL-CCNC: 26 UNITS/L
ALT SERPL-CCNC: 28 UNITS/L
ALT SERPL-CCNC: 29 UNITS/L
ALT SERPL-CCNC: 31 UNIT/L
ALT SERPL-CCNC: 31 UNITS/L
ALT SERPL-CCNC: 33 UNITS/L
ALT SERPL-CCNC: 33 UNITS/L
ALT SERPL-CCNC: 35 UNITS/L
ALT SERPL-CCNC: 37 UNIT/L
ALT SERPL-CCNC: 48 UNITS/L
ALT SERPL-CCNC: 66 UNIT/L
AMMONIA PLAS-SCNC: 20 MCMOL/L
AMMONIA PLAS-SCNC: <10 MCMOL/L
AMORPH SED URNS QL MICRO: ABNORMAL
AMORPH SED URNS QL MICRO: ABNORMAL
AMORPH SED URNS QL MICRO: NORMAL
ANALYZER ANC (IANC): ABNORMAL
ANION GAP SERPL CALC-SCNC: 10 MMOL/L (ref 0–18)
ANION GAP SERPL CALC-SCNC: 10 MMOL/L (ref 10–20)
ANION GAP SERPL CALC-SCNC: 11 MMOL/L (ref 10–20)
ANION GAP SERPL CALC-SCNC: 12 MMOL/L (ref 10–20)
ANION GAP SERPL CALC-SCNC: 13 MMOL/L (ref 10–20)
ANION GAP SERPL CALC-SCNC: 13 MMOL/L (ref 10–20)
ANION GAP SERPL CALC-SCNC: 14 MMOL/L (ref 10–20)
ANION GAP SERPL CALC-SCNC: 15 MMOL/L (ref 10–20)
ANION GAP SERPL CALC-SCNC: 16 MMOL/L (ref 10–20)
ANION GAP SERPL CALC-SCNC: 17 MMOL/L (ref 10–20)
ANION GAP SERPL CALC-SCNC: 18 MMOL/L (ref 10–20)
ANION GAP SERPL CALC-SCNC: 19 MMOL/L (ref 10–20)
ANION GAP SERPL CALC-SCNC: 19 MMOL/L (ref 10–20)
ANION GAP SERPL CALC-SCNC: 20 MMOL/L (ref 10–20)
ANION GAP SERPL CALC-SCNC: 21 MMOL/L (ref 10–20)
ANION GAP SERPL CALC-SCNC: 5 MMOL/L (ref 0–18)
ANION GAP SERPL CALC-SCNC: 5 MMOL/L (ref 0–18)
ANION GAP SERPL CALC-SCNC: 6 MMOL/L (ref 0–18)
ANION GAP SERPL CALC-SCNC: 8 MMOL/L (ref 10–20)
ANION GAP SERPL CALC-SCNC: 8 MMOL/L (ref 10–20)
ANION GAP SERPL CALC-SCNC: 9 MMOL/L (ref 10–20)
ANION GAP SERPL CALC-SCNC: 9 MMOL/L (ref 10–20)
ANION GAP SERPL CALC-SCNC: NORMAL MMOL/L
ANTIBODY SCREEN: NEGATIVE
APPEARANCE UR: ABNORMAL
APPEARANCE UR: ABNORMAL
APPEARANCE UR: CLEAR
APTT PPP: 28 SEC (ref 22–32)
APTT PPP: 53 SECONDS (ref 22–32)
APTT PPP: ABNORMAL S
APTT PPP: NORMAL S
APTT PPP: NORMAL S
AST SERPL-CCNC: 10 UNITS/L
AST SERPL-CCNC: 106 UNIT/L
AST SERPL-CCNC: 11 UNITS/L
AST SERPL-CCNC: 11 UNITS/L
AST SERPL-CCNC: 12 UNITS/L
AST SERPL-CCNC: 16 U/L (ref 15–41)
AST SERPL-CCNC: 16 UNIT/L
AST SERPL-CCNC: 17 UNITS/L
AST SERPL-CCNC: 17 UNITS/L
AST SERPL-CCNC: 19 U/L
AST SERPL-CCNC: 19 UNIT/L
AST SERPL-CCNC: 19 UNIT/L
AST SERPL-CCNC: 21 UNIT/L
AST SERPL-CCNC: 24 UNIT/L
AST SERPL-CCNC: 25 UNIT/L
AST SERPL-CCNC: 26 UNIT/L
AST SERPL-CCNC: 46 UNIT/L
AST SERPL-CCNC: 66 UNIT/L
AST SERPL-CCNC: 87 UNIT/L
AST SERPL-CCNC: 9 UNITS/L
AST SERPL-CCNC: 98 UNIT/L
ATRIAL RATE: 83 BPM
BACTERIA #/AREA URNS HPF: ABNORMAL /HPF
BACTERIA #/AREA URNS HPF: ABNORMAL /HPF
BACTERIA #/AREA URNS HPF: NORMAL /HPF
BACTERIA BLD CULT: NORMAL
BASO+EOS+MONOS # BLD: NORMAL 10*3/UL
BASO+EOS+MONOS NFR BLD: NORMAL %
BASOPHILS # BLD: 0 K/MCL (ref 0–0.3)
BASOPHILS # BLD: 0 THOUSAND/MCL (ref 0–0.3)
BASOPHILS # BLD: 0 X10(3) UL (ref 0–0.2)
BASOPHILS # BLD: ABNORMAL 10*3/UL
BASOPHILS # BLD: NORMAL 10*3/UL
BASOPHILS NFR BLD: 0 %
BASOPHILS NFR BLD: 1 %
BASOPHILS NFR BLD: 2 %
BASOPHILS NFR BLD: 2 %
BASOPHILS NFR BLD: ABNORMAL %
BASOPHILS NFR BLD: NORMAL %
BILIRUB CONJ SERPL-MCNC: 0.5 MG/DL (ref 0–0.2)
BILIRUB SERPL-MCNC: 0.6 MG/DL (ref 0.2–1)
BILIRUB SERPL-MCNC: 0.8 MG/DL (ref 0.2–1)
BILIRUB SERPL-MCNC: 1 MG/DL (ref 0.2–1)
BILIRUB SERPL-MCNC: 1 MG/DL (ref 0.2–1)
BILIRUB SERPL-MCNC: 1.1 MG/DL
BILIRUB SERPL-MCNC: 1.1 MG/DL (ref 0.2–1)
BILIRUB SERPL-MCNC: 1.2 MG/DL (ref 0.2–1)
BILIRUB SERPL-MCNC: 1.3 MG/DL (ref 0.2–1)
BILIRUB SERPL-MCNC: 1.3 MG/DL (ref 0.2–1)
BILIRUB SERPL-MCNC: 1.6 MG/DL (ref 0.2–1)
BILIRUB SERPL-MCNC: 2 MG/DL (ref 0.2–1)
BILIRUB SERPL-MCNC: 2 MG/DL (ref 0.2–1)
BILIRUB SERPL-MCNC: 2.2 MG/DL (ref 0.2–1)
BILIRUB SERPL-MCNC: 2.2 MG/DL (ref 0.2–1)
BILIRUB SERPL-MCNC: 2.3 MG/DL (ref 0.1–2)
BILIRUB SERPL-MCNC: 2.3 MG/DL (ref 0.2–1)
BILIRUB SERPL-MCNC: 2.5 MG/DL (ref 0.2–1)
BILIRUB SERPL-MCNC: 2.7 MG/DL (ref 0.2–1)
BILIRUB SERPL-MCNC: 3.8 MG/DL (ref 0.2–1)
BILIRUB UR QL STRIP.AUTO: NEGATIVE
BILIRUB UR QL: NEGATIVE
BLOOD TYPE BARCODE: 6200
BLOOD TYPE BARCODE: 6200
BOCAVIRUS (RBOCA): NOT DETECTED
BUN BLD-MCNC: 11 MG/DL (ref 8–20)
BUN BLD-MCNC: 14 MG/DL (ref 8–20)
BUN BLD-MCNC: 21 MG/DL (ref 8–20)
BUN BLD-MCNC: 8 MG/DL (ref 8–20)
BUN SERPL-MCNC: 12 MG/DL (ref 6–20)
BUN SERPL-MCNC: 12 MG/DL (ref 6–20)
BUN SERPL-MCNC: 13 MG/DL (ref 6–20)
BUN SERPL-MCNC: 14 MG/DL (ref 6–20)
BUN SERPL-MCNC: 15 MG/DL (ref 6–20)
BUN SERPL-MCNC: 16 MG/DL (ref 6–20)
BUN SERPL-MCNC: 17 MG/DL (ref 6–20)
BUN SERPL-MCNC: 17 MG/DL (ref 6–20)
BUN SERPL-MCNC: 18 MG/DL (ref 6–20)
BUN SERPL-MCNC: 18 MG/DL (ref 6–20)
BUN SERPL-MCNC: 19 MG/DL (ref 6–20)
BUN SERPL-MCNC: 19 MG/DL (ref 6–20)
BUN SERPL-MCNC: 20 MG/DL (ref 6–20)
BUN SERPL-MCNC: 21 MG/DL (ref 6–20)
BUN SERPL-MCNC: 23 MG/DL (ref 6–20)
BUN SERPL-MCNC: 24 MG/DL
BUN SERPL-MCNC: 26 MG/DL (ref 6–20)
BUN SERPL-MCNC: 26 MG/DL (ref 6–20)
BUN SERPL-MCNC: 31 MG/DL (ref 6–20)
BUN SERPL-MCNC: 33 MG/DL (ref 6–20)
BUN SERPL-MCNC: 35 MG/DL (ref 6–20)
BUN SERPL-MCNC: 37 MG/DL (ref 6–20)
BUN SERPL-MCNC: 38 MG/DL (ref 6–20)
BUN SERPL-MCNC: 42 MG/DL (ref 6–20)
BUN SERPL-MCNC: 42 MG/DL (ref 6–20)
BUN SERPL-MCNC: 46 MG/DL (ref 6–20)
BUN SERPL-MCNC: 47 MG/DL (ref 6–20)
BUN SERPL-MCNC: 49 MG/DL (ref 6–20)
BUN SERPL-MCNC: 51 MG/DL (ref 6–20)
BUN SERPL-MCNC: 58 MG/DL (ref 6–20)
BUN SERPL-MCNC: 65 MG/DL (ref 6–20)
BUN/CREAT SERPL: 10.8 (ref 10–20)
BUN/CREAT SERPL: 12 (ref 7–25)
BUN/CREAT SERPL: 12.6 (ref 10–20)
BUN/CREAT SERPL: 13 (ref 7–25)
BUN/CREAT SERPL: 14 (ref 7–25)
BUN/CREAT SERPL: 15 (ref 7–25)
BUN/CREAT SERPL: 15.1 (ref 10–20)
BUN/CREAT SERPL: 16 (ref 7–25)
BUN/CREAT SERPL: 16 (ref 7–25)
BUN/CREAT SERPL: 17 (ref 7–25)
BUN/CREAT SERPL: 18 (ref 7–25)
BUN/CREAT SERPL: 19 (ref 7–25)
BUN/CREAT SERPL: 20 (ref 7–25)
BUN/CREAT SERPL: 24 (ref 7–25)
BUN/CREAT SERPL: 26 (ref 7–25)
BUN/CREAT SERPL: 26 (ref 7–25)
BUN/CREAT SERPL: 28 (ref 7–25)
BUN/CREAT SERPL: 29 (ref 7–25)
BUN/CREAT SERPL: 30 (ref 7–25)
BUN/CREAT SERPL: 32 (ref 7–25)
BUN/CREAT SERPL: 33 (ref 7–25)
BUN/CREAT SERPL: 33 (ref 7–25)
BUN/CREAT SERPL: 36 (ref 7–25)
BUN/CREAT SERPL: 7.9 (ref 10–20)
BUN/CREAT SERPL: NORMAL
BURR CELLS (BURC): ABNORMAL
C DIFF TOX B TCDB STL QL NAA+PROBE: NOT DETECTED
C DIFF TOX B TCDB STL QL NAA+PROBE: NOT DETECTED
C. PNEUMONIAE (RCHLP): NOT DETECTED
CALCIUM BLD-MCNC: 6.5 MG/DL (ref 8.3–10.3)
CALCIUM BLD-MCNC: 6.5 MG/DL (ref 8.3–10.3)
CALCIUM BLD-MCNC: 6.8 MG/DL (ref 8.3–10.3)
CALCIUM BLD-MCNC: 6.8 MG/DL (ref 8.3–10.3)
CALCIUM SERPL-MCNC: 5.1 MG/DL (ref 8.4–10.2)
CALCIUM SERPL-MCNC: 5.3 MG/DL (ref 8.4–10.2)
CALCIUM SERPL-MCNC: 5.4 MG/DL (ref 8.4–10.2)
CALCIUM SERPL-MCNC: 5.5 MG/DL (ref 8.4–10.2)
CALCIUM SERPL-MCNC: 5.6 MG/DL (ref 8.4–10.2)
CALCIUM SERPL-MCNC: 5.7 MG/DL (ref 8.4–10.2)
CALCIUM SERPL-MCNC: 5.8 MG/DL (ref 8.4–10.2)
CALCIUM SERPL-MCNC: 5.9 MG/DL (ref 8.4–10.2)
CALCIUM SERPL-MCNC: 5.9 MG/DL (ref 8.4–10.2)
CALCIUM SERPL-MCNC: 6 MG/DL (ref 8.4–10.2)
CALCIUM SERPL-MCNC: 6 MG/DL (ref 8.4–10.2)
CALCIUM SERPL-MCNC: 6.1 MG/DL (ref 8.4–10.2)
CALCIUM SERPL-MCNC: 6.1 MG/DL (ref 8.4–10.2)
CALCIUM SERPL-MCNC: 6.2 MG/DL (ref 8.4–10.2)
CALCIUM SERPL-MCNC: 6.2 MG/DL (ref 8.4–10.2)
CALCIUM SERPL-MCNC: 6.3 MG/DL (ref 8.4–10.2)
CALCIUM SERPL-MCNC: 6.3 MG/DL (ref 8.4–10.2)
CALCIUM SERPL-MCNC: 6.5 MG/DL (ref 8.4–10.2)
CALCIUM SERPL-MCNC: 6.7 MG/DL (ref 8.4–10.2)
CALCIUM SERPL-MCNC: 7.2 MG/DL (ref 8.4–10.2)
CALCIUM SERPL-MCNC: 7.5 MG/DL (ref 8.4–10.2)
CALCIUM SERPL-MCNC: 7.5 MG/DL (ref 8.4–10.2)
CALCIUM SERPL-MCNC: 7.6 MG/DL (ref 8.4–10.2)
CALCIUM SERPL-MCNC: 7.7 MG/DL (ref 8.4–10.2)
CALCIUM SERPL-MCNC: 8 MG/DL (ref 8.4–10.2)
CALCIUM SERPL-MCNC: 8 MG/DL (ref 8.4–10.2)
CALCIUM SERPL-MCNC: 8.1 MG/DL (ref 8.4–10.2)
CALCIUM SERPL-MCNC: 8.2 MG/DL (ref 8.4–10.2)
CALCIUM SERPL-MCNC: 8.2 MG/DL (ref 8.4–10.2)
CALCIUM SERPL-MCNC: 8.3 MG/DL
CALCIUM SERPL-MCNC: 8.4 MG/DL (ref 8.4–10.2)
CALCIUM SERPL-MCNC: 8.4 MG/DL (ref 8.4–10.2)
CALCIUM SERPL-MCNC: 8.7 MG/DL (ref 8.4–10.2)
CALCIUM SERPL-MCNC: 8.8 MG/DL (ref 8.4–10.2)
CALCIUM SERPL-MCNC: 9 MG/DL (ref 8.4–10.2)
CALCIUM SERPL-MCNC: 9.2 MG/DL (ref 8.4–10.2)
CAOX CRY URNS QL MICRO: ABNORMAL
CAOX CRY URNS QL MICRO: ABNORMAL
CAOX CRY URNS QL MICRO: NORMAL
CHLORIDE SERPL-SCNC: 104 MMOL/L (ref 101–111)
CHLORIDE SERPL-SCNC: 104 MMOL/L (ref 98–107)
CHLORIDE SERPL-SCNC: 105 MMOL/L (ref 98–107)
CHLORIDE SERPL-SCNC: 106 MMOL/L (ref 98–107)
CHLORIDE SERPL-SCNC: 108 MMOL/L (ref 98–107)
CHLORIDE SERPL-SCNC: 108 MMOL/L (ref 98–107)
CHLORIDE SERPL-SCNC: 109 MMOL/L (ref 98–107)
CHLORIDE SERPL-SCNC: 109 MMOL/L (ref 98–107)
CHLORIDE SERPL-SCNC: 110 MMOL/L (ref 101–111)
CHLORIDE SERPL-SCNC: 110 MMOL/L (ref 98–107)
CHLORIDE SERPL-SCNC: 111 MMOL/L (ref 101–111)
CHLORIDE SERPL-SCNC: 112 MMOL/L
CHLORIDE SERPL-SCNC: 112 MMOL/L (ref 101–111)
CHLORIDE SERPL-SCNC: 112 MMOL/L (ref 98–107)
CHLORIDE SERPL-SCNC: 113 MMOL/L (ref 98–107)
CHLORIDE SERPL-SCNC: 113 MMOL/L (ref 98–107)
CHLORIDE SERPL-SCNC: 115 MMOL/L (ref 98–107)
CHLORIDE SERPL-SCNC: 116 MMOL/L (ref 98–107)
CHLORIDE SERPL-SCNC: 117 MMOL/L (ref 98–107)
CHLORIDE SERPL-SCNC: 118 MMOL/L (ref 98–107)
CHLORIDE: 102 MMOL/L (ref 98–107)
CHLORIDE: 103 MMOL/L (ref 98–107)
CHLORIDE: 103 MMOL/L (ref 98–107)
CHLORIDE: 104 MMOL/L (ref 98–107)
CHLORIDE: 105 MMOL/L (ref 98–107)
CHLORIDE: 106 MMOL/L (ref 98–107)
CHLORIDE: 108 MMOL/L (ref 98–107)
CHLORIDE: 110 MMOL/L (ref 98–107)
CHLORIDE: 111 MMOL/L (ref 98–107)
CHLORIDE: 111 MMOL/L (ref 98–107)
CHOLEST SERPL-MCNC: 75 MG/DL
CHOLEST/HDLC SERPL: 2.2 {RATIO}
CK SERPL-CCNC: 72 UNIT/L (ref 39–308)
CLARITY UR REFRACT.AUTO: CLEAR
CLOSURE TME BLD-IMP: NORMAL
CLOSURE TME BLD-IMP: NORMAL
CLOSURE TME COLL+EPINEP BLD: 153 SEC (ref 70–160)
CO2 SERPL-SCNC: 15 MMOL/L (ref 21–32)
CO2 SERPL-SCNC: 17 MMOL/L (ref 21–32)
CO2 SERPL-SCNC: 19 MMOL/L (ref 21–32)
CO2 SERPL-SCNC: 19 MMOL/L (ref 21–32)
CO2 SERPL-SCNC: 20 MMOL/L (ref 21–32)
CO2 SERPL-SCNC: 21 MMOL/L (ref 21–32)
CO2 SERPL-SCNC: 21 MMOL/L (ref 21–32)
CO2 SERPL-SCNC: 22 MMOL/L (ref 21–32)
CO2 SERPL-SCNC: 23 MMOL/L (ref 21–32)
CO2 SERPL-SCNC: 23 MMOL/L (ref 21–32)
CO2 SERPL-SCNC: 24 MMOL/L (ref 21–32)
CO2 SERPL-SCNC: 24 MMOL/L (ref 22–32)
CO2 SERPL-SCNC: 24 MMOL/L (ref 22–32)
CO2 SERPL-SCNC: 25 MMOL/L (ref 21–32)
CO2 SERPL-SCNC: 25 MMOL/L (ref 22–32)
CO2 SERPL-SCNC: 25 MMOL/L (ref 22–32)
CO2 SERPL-SCNC: 26 MMOL/L (ref 21–32)
CO2 SERPL-SCNC: 27 MMOL/L (ref 21–32)
CO2 SERPL-SCNC: 27 MMOL/L (ref 21–32)
CO2 SERPL-SCNC: 28 MMOL/L (ref 21–32)
CO2 SERPL-SCNC: NORMAL MMOL/L
COLOR UR AUTO: YELLOW
COLOR UR: ABNORMAL
COLOR UR: YELLOW
COLOR UR: YELLOW
CORONAVIRUS 229E (RC229E): NOT DETECTED
CORONAVIRUS HKU1 (RCHKU1): NOT DETECTED
CORONAVIRUS NL63 (RCNL63): NOT DETECTED
CORONAVIRUS OC43 (RCO43): NOT DETECTED
CREAT BLD-MCNC: 1.01 MG/DL (ref 0.7–1.3)
CREAT BLD-MCNC: 1.02 MG/DL (ref 0.7–1.3)
CREAT BLD-MCNC: 1.11 MG/DL (ref 0.7–1.3)
CREAT BLD-MCNC: 1.39 MG/DL (ref 0.7–1.3)
CREAT SERPL-MCNC: 0.94 MG/DL (ref 0.67–1.17)
CREAT SERPL-MCNC: 0.97 MG/DL (ref 0.67–1.17)
CREAT SERPL-MCNC: 0.99 MG/DL (ref 0.67–1.17)
CREAT SERPL-MCNC: 1 MG/DL (ref 0.67–1.17)
CREAT SERPL-MCNC: 1 MG/DL (ref 0.67–1.17)
CREAT SERPL-MCNC: 1.01 MG/DL (ref 0.67–1.17)
CREAT SERPL-MCNC: 1.03 MG/DL (ref 0.67–1.17)
CREAT SERPL-MCNC: 1.03 MG/DL (ref 0.67–1.17)
CREAT SERPL-MCNC: 1.04 MG/DL (ref 0.67–1.17)
CREAT SERPL-MCNC: 1.06 MG/DL (ref 0.67–1.17)
CREAT SERPL-MCNC: 1.07 MG/DL (ref 0.67–1.17)
CREAT SERPL-MCNC: 1.08 MG/DL (ref 0.67–1.17)
CREAT SERPL-MCNC: 1.09 MG/DL (ref 0.67–1.17)
CREAT SERPL-MCNC: 1.15 MG/DL (ref 0.67–1.17)
CREAT SERPL-MCNC: 1.15 MG/DL (ref 0.67–1.17)
CREAT SERPL-MCNC: 1.16 MG/DL (ref 0.67–1.17)
CREAT SERPL-MCNC: 1.17 MG/DL (ref 0.67–1.17)
CREAT SERPL-MCNC: 1.17 MG/DL (ref 0.67–1.17)
CREAT SERPL-MCNC: 1.19 MG/DL (ref 0.67–1.17)
CREAT SERPL-MCNC: 1.19 MG/DL (ref 0.67–1.17)
CREAT SERPL-MCNC: 1.2 MG/DL (ref 0.67–1.17)
CREAT SERPL-MCNC: 1.21 MG/DL (ref 0.67–1.17)
CREAT SERPL-MCNC: 1.23 MG/DL (ref 0.67–1.17)
CREAT SERPL-MCNC: 1.23 MG/DL (ref 0.67–1.17)
CREAT SERPL-MCNC: 1.27 MG/DL (ref 0.67–1.17)
CREAT SERPL-MCNC: 1.27 MG/DL (ref 0.67–1.17)
CREAT SERPL-MCNC: 1.32 MG/DL (ref 0.67–1.17)
CREAT SERPL-MCNC: 1.33 MG/DL (ref 0.67–1.17)
CREAT SERPL-MCNC: 1.35 MG/DL (ref 0.67–1.17)
CREAT SERPL-MCNC: 1.36 MG/DL (ref 0.67–1.17)
CREAT SERPL-MCNC: 1.37 MG/DL
CREAT SERPL-MCNC: 1.37 MG/DL (ref 0.67–1.17)
CREAT SERPL-MCNC: 1.39 MG/DL (ref 0.67–1.17)
CREAT SERPL-MCNC: 1.54 MG/DL (ref 0.67–1.17)
CREAT SERPL-MCNC: 1.69 MG/DL (ref 0.67–1.17)
CREAT SERPL-MCNC: 1.72 MG/DL (ref 0.67–1.17)
CREAT SERPL-MCNC: 1.95 MG/DL (ref 0.67–1.17)
CREAT SERPL-MCNC: 2.01 MG/DL (ref 0.67–1.17)
CREAT SERPL-MCNC: 2.74 MG/DL (ref 0.67–1.17)
DEPRECATED RDW RBC AUTO: 45.2 FL (ref 35.1–46.3)
DEPRECATED RDW RBC AUTO: 45.6 FL (ref 35.1–46.3)
DEPRECATED RDW RBC AUTO: 46.1 FL (ref 35.1–46.3)
DEPRECATED RDW RBC AUTO: 46.8 FL (ref 35.1–46.3)
DEPRECATED RDW RBC AUTO: 48.1 FL (ref 35.1–46.3)
DIFFERENTIAL METHOD BLD: ABNORMAL
DIFFERENTIAL METHOD BLD: NORMAL
DOHLE BODIES (DOHL): PRESENT
EOSINOPHIL # BLD: 0 K/MCL (ref 0.1–0.5)
EOSINOPHIL # BLD: 0 THOUSAND/MCL (ref 0.1–0.5)
EOSINOPHIL # BLD: 0 X10(3) UL (ref 0–0.7)
EOSINOPHIL # BLD: 0 X10(3) UL (ref 0–0.7)
EOSINOPHIL # BLD: 0.04 X10(3) UL (ref 0–0.7)
EOSINOPHIL # BLD: 0.09 X10(3) UL (ref 0–0.7)
EOSINOPHIL # BLD: 0.1 THOUSAND/MCL (ref 0.1–0.5)
EOSINOPHIL # BLD: 0.14 X10(3) UL (ref 0–0.7)
EOSINOPHIL # BLD: 0.2 K/MCL (ref 0.1–0.5)
EOSINOPHIL # BLD: ABNORMAL 10*3/UL
EOSINOPHIL # BLD: NORMAL 10*3/UL
EOSINOPHIL NFR BLD: 0 %
EOSINOPHIL NFR BLD: 1 %
EOSINOPHIL NFR BLD: 2 %
EOSINOPHIL NFR BLD: 2 %
EOSINOPHIL NFR BLD: 3 %
EOSINOPHIL NFR BLD: 3 %
EOSINOPHIL NFR BLD: ABNORMAL %
EOSINOPHIL NFR BLD: NORMAL %
EPITH CASTS #/AREA URNS LPF: ABNORMAL /[LPF]
EPITH CASTS #/AREA URNS LPF: ABNORMAL /[LPF]
EPITH CASTS #/AREA URNS LPF: NORMAL /[LPF]
ERYTHROCYTE [DISTWIDTH] IN BLOOD BY AUTOMATED COUNT: 15 % (ref 11–15)
ERYTHROCYTE [DISTWIDTH] IN BLOOD BY AUTOMATED COUNT: 15 % (ref 11–15)
ERYTHROCYTE [DISTWIDTH] IN BLOOD BY AUTOMATED COUNT: 15.1 % (ref 11–15)
ERYTHROCYTE [DISTWIDTH] IN BLOOD BY AUTOMATED COUNT: 15.4 % (ref 11–15)
ERYTHROCYTE [DISTWIDTH] IN BLOOD BY AUTOMATED COUNT: 15.5 % (ref 11–15)
ERYTHROCYTE [DISTWIDTH] IN BLOOD: 13.2 % (ref 11–15)
ERYTHROCYTE [DISTWIDTH] IN BLOOD: 13.2 % (ref 11–15)
ERYTHROCYTE [DISTWIDTH] IN BLOOD: 13.3 % (ref 11–15)
ERYTHROCYTE [DISTWIDTH] IN BLOOD: 13.4 % (ref 11–15)
ERYTHROCYTE [DISTWIDTH] IN BLOOD: 13.5 % (ref 11–15)
ERYTHROCYTE [DISTWIDTH] IN BLOOD: 13.6 % (ref 11–15)
ERYTHROCYTE [DISTWIDTH] IN BLOOD: 15.5 % (ref 11–15)
ERYTHROCYTE [DISTWIDTH] IN BLOOD: 15.6 % (ref 11–15)
ERYTHROCYTE [DISTWIDTH] IN BLOOD: 15.7 % (ref 11–15)
ERYTHROCYTE [DISTWIDTH] IN BLOOD: 15.8 % (ref 11–15)
ERYTHROCYTE [DISTWIDTH] IN BLOOD: 15.9 % (ref 11–15)
ERYTHROCYTE [DISTWIDTH] IN BLOOD: 16.1 % (ref 11–15)
ERYTHROCYTE [DISTWIDTH] IN BLOOD: 16.2 % (ref 11–15)
ERYTHROCYTE [DISTWIDTH] IN BLOOD: 16.5 % (ref 11–15)
ERYTHROCYTE [DISTWIDTH] IN BLOOD: 16.6 % (ref 11–15)
ERYTHROCYTE [DISTWIDTH] IN BLOOD: 16.8 % (ref 11–15)
ERYTHROCYTE [DISTWIDTH] IN BLOOD: 17.2 % (ref 11–15)
ERYTHROCYTE [DISTWIDTH] IN BLOOD: 17.3 % (ref 11–15)
ERYTHROCYTE [DISTWIDTH] IN BLOOD: 17.4 % (ref 11–15)
ERYTHROCYTE [DISTWIDTH] IN BLOOD: 17.5 % (ref 11–15)
ERYTHROCYTE [DISTWIDTH] IN BLOOD: 17.5 % (ref 11–15)
ERYTHROCYTE [DISTWIDTH] IN BLOOD: 17.7 % (ref 11–15)
ERYTHROCYTE [DISTWIDTH] IN BLOOD: 17.9 % (ref 11–15)
ERYTHROCYTE [DISTWIDTH] IN BLOOD: 18.1 % (ref 11–15)
ERYTHROCYTE [DISTWIDTH] IN BLOOD: 18.5 % (ref 11–15)
ERYTHROCYTE [DISTWIDTH] IN BLOOD: 18.9 % (ref 11–15)
ERYTHROCYTE [DISTWIDTH] IN BLOOD: 19.1 % (ref 11–15)
ERYTHROCYTE [DISTWIDTH] IN BLOOD: NORMAL %
FATTY CASTS #/AREA URNS LPF: ABNORMAL /[LPF]
FATTY CASTS #/AREA URNS LPF: ABNORMAL /[LPF]
FATTY CASTS #/AREA URNS LPF: NORMAL /[LPF]
FERRITIN SERPL-MCNC: 3782 NG/ML (ref 26–388)
FOLATE SERPL-MCNC: 4.6 NG/ML
GLOBULIN PLAS-MCNC: 3.3 G/DL (ref 2.8–4.4)
GLOBULIN SER-MCNC: 2.4 G/DL (ref 2–4)
GLOBULIN SER-MCNC: 2.5 G/DL (ref 2–4)
GLOBULIN SER-MCNC: 2.6 G/DL (ref 2–4)
GLOBULIN SER-MCNC: 2.7 G/DL
GLOBULIN SER-MCNC: 2.7 G/DL (ref 2–4)
GLOBULIN SER-MCNC: 2.7 G/DL (ref 2–4)
GLOBULIN SER-MCNC: 2.8 G/DL (ref 2–4)
GLOBULIN SER-MCNC: 3.1 GM/DL (ref 2–4)
GLOBULIN SER-MCNC: 3.2 GM/DL (ref 2–4)
GLOBULIN SER-MCNC: 3.3 G/DL (ref 2–4)
GLOBULIN SER-MCNC: 3.3 GM/DL (ref 2–4)
GLOBULIN SER-MCNC: 3.4 GM/DL (ref 2–4)
GLOBULIN SER-MCNC: 3.5 GM/DL (ref 2–4)
GLOBULIN SER-MCNC: 3.6 G/DL (ref 2–4)
GLOBULIN SER-MCNC: 3.7 GM/DL (ref 2–4)
GLOBULIN SER-MCNC: 3.8 GM/DL (ref 2–4)
GLOBULIN SER-MCNC: 3.8 GM/DL (ref 2–4)
GLOBULIN SER-MCNC: 3.9 GM/DL (ref 2–4)
GLOBULIN SER-MCNC: 4.1 GM/DL (ref 2–4)
GLOBULIN SER-MCNC: 4.1 GM/DL (ref 2–4)
GLOBULIN SER-MCNC: 4.2 GM/DL (ref 2–4)
GLUCOSE BLD-MCNC: 82 MG/DL (ref 70–99)
GLUCOSE BLD-MCNC: 86 MG/DL (ref 65–99)
GLUCOSE BLD-MCNC: 87 MG/DL (ref 70–99)
GLUCOSE BLD-MCNC: 91 MG/DL (ref 70–99)
GLUCOSE BLD-MCNC: 92 MG/DL (ref 70–99)
GLUCOSE BLDC GLUCOMTR-MCNC: 100 MG/DL (ref 65–99)
GLUCOSE BLDC GLUCOMTR-MCNC: 104 MG/DL (ref 70–99)
GLUCOSE BLDC GLUCOMTR-MCNC: 107 MG/DL (ref 65–99)
GLUCOSE BLDC GLUCOMTR-MCNC: 110 MG/DL (ref 65–99)
GLUCOSE BLDC GLUCOMTR-MCNC: 110 MG/DL (ref 65–99)
GLUCOSE BLDC GLUCOMTR-MCNC: 113 MG/DL (ref 65–99)
GLUCOSE BLDC GLUCOMTR-MCNC: 114 MG/DL (ref 65–99)
GLUCOSE BLDC GLUCOMTR-MCNC: 115 MG/DL (ref 65–99)
GLUCOSE BLDC GLUCOMTR-MCNC: 116 MG/DL (ref 65–99)
GLUCOSE BLDC GLUCOMTR-MCNC: 118 MG/DL (ref 70–99)
GLUCOSE BLDC GLUCOMTR-MCNC: 119 MG/DL (ref 65–99)
GLUCOSE BLDC GLUCOMTR-MCNC: 120 MG/DL (ref 65–99)
GLUCOSE BLDC GLUCOMTR-MCNC: 120 MG/DL (ref 65–99)
GLUCOSE BLDC GLUCOMTR-MCNC: 120 MG/DL (ref 70–99)
GLUCOSE BLDC GLUCOMTR-MCNC: 121 MG/DL (ref 65–99)
GLUCOSE BLDC GLUCOMTR-MCNC: 121 MG/DL (ref 70–99)
GLUCOSE BLDC GLUCOMTR-MCNC: 127 MG/DL (ref 65–99)
GLUCOSE BLDC GLUCOMTR-MCNC: 129 MG/DL (ref 65–99)
GLUCOSE BLDC GLUCOMTR-MCNC: 130 MG/DL (ref 65–99)
GLUCOSE BLDC GLUCOMTR-MCNC: 131 MG/DL (ref 65–99)
GLUCOSE BLDC GLUCOMTR-MCNC: 131 MG/DL (ref 65–99)
GLUCOSE BLDC GLUCOMTR-MCNC: 134 MG/DL (ref 65–99)
GLUCOSE BLDC GLUCOMTR-MCNC: 135 MG/DL (ref 65–99)
GLUCOSE BLDC GLUCOMTR-MCNC: 138 MG/DL (ref 65–99)
GLUCOSE BLDC GLUCOMTR-MCNC: 140 MG/DL (ref 65–99)
GLUCOSE BLDC GLUCOMTR-MCNC: 142 MG/DL (ref 65–99)
GLUCOSE BLDC GLUCOMTR-MCNC: 142 MG/DL (ref 65–99)
GLUCOSE BLDC GLUCOMTR-MCNC: 146 MG/DL (ref 65–99)
GLUCOSE BLDC GLUCOMTR-MCNC: 148 MG/DL (ref 65–99)
GLUCOSE BLDC GLUCOMTR-MCNC: 150 MG/DL (ref 65–99)
GLUCOSE BLDC GLUCOMTR-MCNC: 154 MG/DL (ref 65–99)
GLUCOSE BLDC GLUCOMTR-MCNC: 163 MG/DL (ref 65–99)
GLUCOSE BLDC GLUCOMTR-MCNC: 164 MG/DL (ref 65–99)
GLUCOSE BLDC GLUCOMTR-MCNC: 165 MG/DL (ref 65–99)
GLUCOSE BLDC GLUCOMTR-MCNC: 168 MG/DL (ref 65–99)
GLUCOSE BLDC GLUCOMTR-MCNC: 171 MG/DL (ref 65–99)
GLUCOSE BLDC GLUCOMTR-MCNC: 171 MG/DL (ref 65–99)
GLUCOSE BLDC GLUCOMTR-MCNC: 174 MG/DL (ref 65–99)
GLUCOSE BLDC GLUCOMTR-MCNC: 176 MG/DL (ref 65–99)
GLUCOSE BLDC GLUCOMTR-MCNC: 176 MG/DL (ref 65–99)
GLUCOSE BLDC GLUCOMTR-MCNC: 178 MG/DL (ref 65–99)
GLUCOSE BLDC GLUCOMTR-MCNC: 181 MG/DL (ref 65–99)
GLUCOSE BLDC GLUCOMTR-MCNC: 186 MG/DL (ref 65–99)
GLUCOSE BLDC GLUCOMTR-MCNC: 194 MG/DL (ref 65–99)
GLUCOSE BLDC GLUCOMTR-MCNC: 194 MG/DL (ref 65–99)
GLUCOSE BLDC GLUCOMTR-MCNC: 202 MG/DL (ref 65–99)
GLUCOSE BLDC GLUCOMTR-MCNC: 214 MG/DL (ref 65–99)
GLUCOSE BLDC GLUCOMTR-MCNC: 75 MG/DL (ref 70–99)
GLUCOSE BLDC GLUCOMTR-MCNC: 76 MG/DL (ref 70–99)
GLUCOSE BLDC GLUCOMTR-MCNC: 76 MG/DL (ref 70–99)
GLUCOSE BLDC GLUCOMTR-MCNC: 77 MG/DL (ref 70–99)
GLUCOSE BLDC GLUCOMTR-MCNC: 79 MG/DL (ref 70–99)
GLUCOSE BLDC GLUCOMTR-MCNC: 90 MG/DL (ref 70–99)
GLUCOSE BLDC GLUCOMTR-MCNC: 93 MG/DL (ref 70–99)
GLUCOSE BLDC GLUCOMTR-MCNC: 95 MG/DL (ref 70–99)
GLUCOSE BLDC GLUCOMTR-MCNC: ABNORMAL MG/DL
GLUCOSE BLDC GLUCOMTR-MCNC: NORMAL MG/DL
GLUCOSE SERPL-MCNC: 101 MG/DL (ref 65–99)
GLUCOSE SERPL-MCNC: 102 MG/DL (ref 65–99)
GLUCOSE SERPL-MCNC: 107 MG/DL (ref 65–99)
GLUCOSE SERPL-MCNC: 114 MG/DL (ref 65–99)
GLUCOSE SERPL-MCNC: 116 MG/DL (ref 65–99)
GLUCOSE SERPL-MCNC: 120 MG/DL (ref 65–99)
GLUCOSE SERPL-MCNC: 120 MG/DL (ref 65–99)
GLUCOSE SERPL-MCNC: 134 MG/DL (ref 65–99)
GLUCOSE SERPL-MCNC: 134 MG/DL (ref 65–99)
GLUCOSE SERPL-MCNC: 136 MG/DL (ref 65–99)
GLUCOSE SERPL-MCNC: 137 MG/DL (ref 65–99)
GLUCOSE SERPL-MCNC: 139 MG/DL (ref 65–99)
GLUCOSE SERPL-MCNC: 141 MG/DL (ref 65–99)
GLUCOSE SERPL-MCNC: 146 MG/DL (ref 65–99)
GLUCOSE SERPL-MCNC: 151 MG/DL (ref 65–99)
GLUCOSE SERPL-MCNC: 155 MG/DL (ref 65–99)
GLUCOSE SERPL-MCNC: 197 MG/DL (ref 65–99)
GLUCOSE SERPL-MCNC: 78 MG/DL (ref 65–99)
GLUCOSE SERPL-MCNC: 78 MG/DL (ref 65–99)
GLUCOSE SERPL-MCNC: 81 MG/DL (ref 65–99)
GLUCOSE SERPL-MCNC: 82 MG/DL (ref 65–99)
GLUCOSE SERPL-MCNC: 83 MG/DL (ref 65–99)
GLUCOSE SERPL-MCNC: 84 MG/DL (ref 65–99)
GLUCOSE SERPL-MCNC: 86 MG/DL (ref 65–99)
GLUCOSE SERPL-MCNC: 86 MG/DL (ref 65–99)
GLUCOSE SERPL-MCNC: 87 MG/DL (ref 65–99)
GLUCOSE SERPL-MCNC: 87 MG/DL (ref 65–99)
GLUCOSE SERPL-MCNC: 89 MG/DL (ref 65–99)
GLUCOSE SERPL-MCNC: 90 MG/DL (ref 65–99)
GLUCOSE SERPL-MCNC: 90 MG/DL (ref 65–99)
GLUCOSE SERPL-MCNC: 91 MG/DL (ref 65–99)
GLUCOSE SERPL-MCNC: 94 MG/DL (ref 65–99)
GLUCOSE SERPL-MCNC: 96 MG/DL
GLUCOSE SERPL-MCNC: 97 MG/DL (ref 65–99)
GLUCOSE SERPL-MCNC: 97 MG/DL (ref 65–99)
GLUCOSE SERPL-MCNC: 98 MG/DL (ref 65–99)
GLUCOSE SERPL-MCNC: 99 MG/DL (ref 65–99)
GLUCOSE UR STRIP.AUTO-MCNC: NEGATIVE MG/DL
GLUCOSE UR-MCNC: NEGATIVE MG/DL
GRAN CASTS #/AREA URNS LPF: ABNORMAL /LPF
GRAN CASTS #/AREA URNS LPF: ABNORMAL /[LPF]
GRAN CASTS #/AREA URNS LPF: NORMAL /[LPF]
HAV IGM SER QL: 1.6 MG/DL (ref 1.8–2.5)
HAV IGM SER QL: 2 MG/DL (ref 1.8–2.5)
HCT VFR BLD AUTO: 17.8 % (ref 39–53)
HCT VFR BLD AUTO: 20.5 % (ref 39–53)
HCT VFR BLD AUTO: 21.5 % (ref 39–53)
HCT VFR BLD AUTO: 24.3 % (ref 39–53)
HCT VFR BLD AUTO: 24.7 % (ref 39–53)
HCT VFR BLD CALC: 17.2 % (ref 39–51)
HCT VFR BLD CALC: 19.2 % (ref 39–51)
HCT VFR BLD CALC: 20.1 % (ref 39–51)
HCT VFR BLD CALC: 20.3 % (ref 39–51)
HCT VFR BLD CALC: 21.8 % (ref 39–51)
HCT VFR BLD CALC: 23.3 % (ref 39–51)
HCT VFR BLD CALC: 23.9 % (ref 39–51)
HCT VFR BLD CALC: 23.9 % (ref 39–51)
HCT VFR BLD CALC: 24.2 % (ref 39–51)
HCT VFR BLD CALC: 24.5 % (ref 39–51)
HCT VFR BLD CALC: 25.9 % (ref 39–51)
HCT VFR BLD CALC: 25.9 % (ref 39–51)
HCT VFR BLD CALC: 26.4 % (ref 39–51)
HCT VFR BLD CALC: 28.1 %
HCT VFR BLD CALC: 30.6 % (ref 39–51)
HCT VFR BLD CALC: 34.3 % (ref 39–51)
HCT VFR BLD CALC: 35.8 % (ref 39–51)
HCT VFR BLD CALC: 36.2 % (ref 39–51)
HCT VFR BLD CALC: 36.6 % (ref 39–51)
HCT VFR BLD CALC: 36.7 % (ref 39–51)
HCT VFR BLD CALC: 37 % (ref 39–51)
HCT VFR BLD CALC: 37.6 % (ref 39–51)
HCT VFR BLD CALC: 38.8 % (ref 39–51)
HCT VFR BLD CALC: 39.4 % (ref 39–51)
HDLC SERPL-MCNC: 34 MG/DL
HEMATOCRIT: 19.2 % (ref 39–51)
HEMATOCRIT: 21.4 % (ref 39–51)
HEMATOCRIT: 21.5 % (ref 39–51)
HEMATOCRIT: 21.5 % (ref 39–51)
HEMATOCRIT: 22.3 % (ref 39–51)
HEMATOCRIT: 22.9 % (ref 39–51)
HEMATOCRIT: 23.1 % (ref 39–51)
HEMATOCRIT: 23.1 % (ref 39–51)
HEMATOCRIT: 23.3 % (ref 39–51)
HEMATOCRIT: 23.6 % (ref 39–51)
HEMATOCRIT: 23.7 % (ref 39–51)
HEMATOCRIT: 24 % (ref 39–51)
HEMATOCRIT: 24.4 % (ref 39–51)
HEMATOCRIT: 25 % (ref 39–51)
HEMATOCRIT: 25 % (ref 39–51)
HEMATOCRIT: 25.8 % (ref 39–51)
HEMATOCRIT: 25.9 % (ref 39–51)
HEMATOCRIT: 25.9 % (ref 39–51)
HGB BLD-MCNC: 10.1 G/DL (ref 13–17)
HGB BLD-MCNC: 11.1 G/DL (ref 13–17)
HGB BLD-MCNC: 11.8 G/DL (ref 13–17)
HGB BLD-MCNC: 12 G/DL (ref 13–17)
HGB BLD-MCNC: 12.1 G/DL (ref 13–17)
HGB BLD-MCNC: 12.3 G/DL (ref 13–17)
HGB BLD-MCNC: 12.4 G/DL (ref 13–17)
HGB BLD-MCNC: 12.5 G/DL (ref 13–17)
HGB BLD-MCNC: 12.9 G/DL (ref 13–17)
HGB BLD-MCNC: 13.3 G/DL (ref 13–17)
HGB BLD-MCNC: 5.7 G/DL (ref 13–17)
HGB BLD-MCNC: 6 G/DL (ref 13–17.5)
HGB BLD-MCNC: 6.2 G/DL (ref 13–17)
HGB BLD-MCNC: 6.2 GM/DL (ref 13–17)
HGB BLD-MCNC: 6.5 GM/DL (ref 13–17)
HGB BLD-MCNC: 6.6 G/DL (ref 13–17)
HGB BLD-MCNC: 6.7 GM/DL (ref 13–17)
HGB BLD-MCNC: 6.8 G/DL (ref 13–17)
HGB BLD-MCNC: 6.9 G/DL (ref 13–17.5)
HGB BLD-MCNC: 7 GM/DL (ref 13–17)
HGB BLD-MCNC: 7.1 G/DL (ref 13–17)
HGB BLD-MCNC: 7.2 GM/DL (ref 13–17)
HGB BLD-MCNC: 7.3 G/DL (ref 13–17.5)
HGB BLD-MCNC: 7.3 GM/DL (ref 13–17)
HGB BLD-MCNC: 7.3 GM/DL (ref 13–17)
HGB BLD-MCNC: 7.5 GM/DL (ref 13–17)
HGB BLD-MCNC: 7.6 GM/DL (ref 13–17)
HGB BLD-MCNC: 7.7 G/DL (ref 13–17)
HGB BLD-MCNC: 7.8 G/DL (ref 13–17)
HGB BLD-MCNC: 7.8 G/DL (ref 13–17)
HGB BLD-MCNC: 7.8 GM/DL (ref 13–17)
HGB BLD-MCNC: 7.8 GM/DL (ref 13–17)
HGB BLD-MCNC: 7.9 GM/DL (ref 13–17)
HGB BLD-MCNC: 8 G/DL (ref 13–17)
HGB BLD-MCNC: 8.1 GM/DL (ref 13–17)
HGB BLD-MCNC: 8.2 G/DL (ref 13–17.5)
HGB BLD-MCNC: 8.3 G/DL (ref 13–17)
HGB BLD-MCNC: 8.3 G/DL (ref 13–17)
HGB BLD-MCNC: 8.3 GM/DL (ref 13–17)
HGB BLD-MCNC: 8.5 G/DL (ref 13–17)
HGB BLD-MCNC: 8.5 G/DL (ref 13–17.5)
HGB BLD-MCNC: 8.9 G/DL
HGB BLD-MCNC: 9 G/DL (ref 13–17)
HGB BLD-MCNC: ABNORMAL G/DL
HGB UR QL: ABNORMAL
HGB UR QL: NEGATIVE
HGB UR QL: NEGATIVE
HYALINE CASTS #/AREA URNS LPF: ABNORMAL /LPF (ref 0–5)
HYALINE CASTS #/AREA URNS LPF: ABNORMAL /LPF (ref 0–5)
HYALINE CASTS #/AREA URNS LPF: NORMAL /LPF (ref 0–5)
HYPOCHROMIA (HYPOC): ABNORMAL
IMM GRANULOCYTES # BLD AUTO: 0 K/MCL (ref 0–0.2)
IMM GRANULOCYTES # BLD AUTO: 0.1 K/MCL (ref 0–0.2)
IMM GRANULOCYTES # BLD AUTO: 0.2 K/MCL (ref 0–0.2)
IMM GRANULOCYTES # BLD AUTO: ABNORMAL 10*3/UL
IMM GRANULOCYTES NFR BLD: 0 %
IMM GRANULOCYTES NFR BLD: 1 %
IMM GRANULOCYTES NFR BLD: 2 %
IMM GRANULOCYTES NFR BLD: ABNORMAL %
IMMATURE GRANULOCYTES (OFFPRE25): NORMAL
INFLUENZA A SUBTYPE H1 (RFLH1): NOT DETECTED
INFLUENZA A SUBTYPE H3 (RFLH3): NOT DETECTED
INFLUENZA A UNSUBTYPABLE (RIAU): NORMAL
INFLUENZA B VIRUS (XFLUB): NOT DETECTED
INR PPP: 1.1
INR PPP: 1.5
INR PPP: NORMAL
IRON SATN MFR SERPL: 56 % (ref 15–45)
IRON SERPL-MCNC: 90 MCG/DL (ref 65–175)
KETONES UR STRIP.AUTO-MCNC: NEGATIVE MG/DL
KETONES UR-MCNC: NEGATIVE MG/DL
LACTATE BLDA-SCNC: 0.5 MMOL/L (ref 0–2)
LACTATE BLDV-MCNC: 0.7 MMOL/L
LACTATE BLDV-MCNC: 0.8 MMOL/L
LACTATE BLDV-MCNC: 0.9 MMOL/L
LACTATE BLDV-MCNC: 2.1 MMOL/L
LACTATE BLDV-MCNC: 4.4 MMOL/L
LDLC SERPL CALC-MCNC: 23 MG/DL
LENGTH OF FAST TIME PATIENT: NORMAL H
LENGTH OF FAST TIME PATIENT: NORMAL H
LEUKOCYTE ESTERASE UR QL STRIP.AUTO: NEGATIVE
LEUKOCYTE ESTERASE UR QL STRIP: NEGATIVE
LIPASE SERPL-CCNC: 136 UNITS/L (ref 73–393)
LYMPHOCYTES # BLD: 0.3 K/MCL (ref 1–4)
LYMPHOCYTES # BLD: 0.3 THOUSAND/MCL (ref 1–4)
LYMPHOCYTES # BLD: 0.4 K/MCL (ref 1–4)
LYMPHOCYTES # BLD: 0.4 THOUSAND/MCL (ref 1–4)
LYMPHOCYTES # BLD: 0.5 K/MCL (ref 1–4)
LYMPHOCYTES # BLD: 0.5 THOUSAND/MCL (ref 1–4)
LYMPHOCYTES # BLD: 0.5 THOUSAND/MCL (ref 1–4)
LYMPHOCYTES # BLD: 0.6 K/MCL (ref 1–4)
LYMPHOCYTES # BLD: 0.6 THOUSAND/MCL (ref 1–4)
LYMPHOCYTES # BLD: 0.6 THOUSAND/MCL (ref 1–4)
LYMPHOCYTES # BLD: 0.7 THOUSAND/MCL (ref 1–4)
LYMPHOCYTES # BLD: 0.8 K/MCL (ref 1–4)
LYMPHOCYTES # BLD: 0.8 THOUSAND/MCL (ref 1–4)
LYMPHOCYTES # BLD: 0.9 THOUSAND/MCL (ref 1–4)
LYMPHOCYTES # BLD: 1.2 K/MCL (ref 1–4)
LYMPHOCYTES # BLD: 1.2 THOUSAND/MCL (ref 1–4)
LYMPHOCYTES # BLD: ABNORMAL 10*3/UL
LYMPHOCYTES # BLD: NORMAL 10*3/UL
LYMPHOCYTES NFR BLD: 0.69 X10(3) UL (ref 1–4)
LYMPHOCYTES NFR BLD: 1.05 X10(3) UL (ref 1–4)
LYMPHOCYTES NFR BLD: 1.16 X10(3) UL (ref 1–4)
LYMPHOCYTES NFR BLD: 1.17 X10(3) UL (ref 1–4)
LYMPHOCYTES NFR BLD: 1.62 X10(3) UL (ref 1–4)
LYMPHOCYTES NFR BLD: 11 %
LYMPHOCYTES NFR BLD: 12 %
LYMPHOCYTES NFR BLD: 13 %
LYMPHOCYTES NFR BLD: 14 %
LYMPHOCYTES NFR BLD: 16 %
LYMPHOCYTES NFR BLD: 16 %
LYMPHOCYTES NFR BLD: 17 %
LYMPHOCYTES NFR BLD: 18 %
LYMPHOCYTES NFR BLD: 19 %
LYMPHOCYTES NFR BLD: 23 %
LYMPHOCYTES NFR BLD: 25 %
LYMPHOCYTES NFR BLD: 26 %
LYMPHOCYTES NFR BLD: 30 %
LYMPHOCYTES NFR BLD: 33 %
LYMPHOCYTES NFR BLD: 36 %
LYMPHOCYTES NFR BLD: 38 %
LYMPHOCYTES NFR BLD: 4 %
LYMPHOCYTES NFR BLD: 43 %
LYMPHOCYTES NFR BLD: 5 %
LYMPHOCYTES NFR BLD: 5 %
LYMPHOCYTES NFR BLD: 66 %
LYMPHOCYTES NFR BLD: 7 %
LYMPHOCYTES NFR BLD: 7 %
LYMPHOCYTES NFR BLD: 71 %
LYMPHOCYTES NFR BLD: 8 %
LYMPHOCYTES NFR BLD: 9 %
LYMPHOCYTES NFR BLD: ABNORMAL %
LYMPHOCYTES NFR BLD: NORMAL %
M PROTEIN MFR SERPL ELPH: 6.2 G/DL (ref 6.4–8.2)
M. PNEUMONIAE (RMYPP): NOT DETECTED
MAGNESIUM SERPL-MCNC: 1.3 MG/DL (ref 1.7–2.4)
MAGNESIUM SERPL-MCNC: 1.4 MG/DL (ref 1.7–2.4)
MAGNESIUM SERPL-MCNC: 1.5 MG/DL (ref 1.7–2.4)
MAGNESIUM SERPL-MCNC: 1.5 MG/DL (ref 1.7–2.4)
MAGNESIUM SERPL-MCNC: 1.6 MG/DL (ref 1.7–2.4)
MAGNESIUM SERPL-MCNC: 1.7 MG/DL (ref 1.7–2.4)
MAGNESIUM SERPL-MCNC: 1.9 MG/DL (ref 1.7–2.4)
MAGNESIUM SERPL-MCNC: 2.1 MG/DL (ref 1.7–2.4)
MAGNESIUM SERPL-MCNC: 2.2 MG/DL (ref 1.7–2.4)
MCH RBC QN AUTO: 26.7 PG (ref 26–34)
MCH RBC QN AUTO: 27.1 PG (ref 26–34)
MCH RBC QN AUTO: 27.1 PG (ref 26–34)
MCH RBC QN AUTO: 27.5 PG (ref 26–34)
MCH RBC QN AUTO: 27.6 PG (ref 26–34)
MCH RBC QN AUTO: 27.8 PG (ref 26–34)
MCH RBC QN AUTO: 27.9 PG (ref 26–34)
MCH RBC QN AUTO: 28.1 PG (ref 26–34)
MCH RBC QN AUTO: 28.4 PG (ref 26–34)
MCH RBC QN AUTO: 28.5 PG (ref 26–34)
MCH RBC QN AUTO: 28.6 PG (ref 26–34)
MCH RBC QN AUTO: 28.6 PG (ref 26–34)
MCH RBC QN AUTO: 28.7 PG (ref 26–34)
MCH RBC QN AUTO: 29 PG (ref 26–34)
MCH RBC QN AUTO: 29 PG (ref 26–34)
MCH RBC QN AUTO: 29.4 PG (ref 26–34)
MCH RBC QN AUTO: 30 PG (ref 26–34)
MCH RBC QN AUTO: 30.3 PG (ref 26–34)
MCH RBC QN AUTO: 30.5 PG (ref 26–34)
MCH RBC QN AUTO: 30.6 PG (ref 26–34)
MCH RBC QN AUTO: 31 PG (ref 26–34)
MCH RBC QN AUTO: 31.3 PG (ref 26–34)
MCH RBC QN AUTO: 31.4 PG (ref 26–34)
MCH RBC QN AUTO: 31.5 PG (ref 26–34)
MCH RBC QN AUTO: 31.5 PG (ref 26–34)
MCH RBC QN AUTO: 31.6 PG (ref 26–34)
MCH RBC QN AUTO: 31.6 PG (ref 26–34)
MCH RBC QN AUTO: 32 PG
MCH RBC QN AUTO: 32.2 PG (ref 26–34)
MCH RBC QN AUTO: 32.2 PG (ref 26–34)
MCH RBC QN AUTO: 32.4 PG (ref 26–34)
MCH RBC QN AUTO: 32.8 PG (ref 26–34)
MCHC RBC AUTO-ENTMCNC: 30.4 GM/DL (ref 32–36.5)
MCHC RBC AUTO-ENTMCNC: 30.9 GM/DL (ref 32–36.5)
MCHC RBC AUTO-ENTMCNC: 31.2 GM/DL (ref 32–36.5)
MCHC RBC AUTO-ENTMCNC: 31.2 GM/DL (ref 32–36.5)
MCHC RBC AUTO-ENTMCNC: 31.4 GM/DL (ref 32–36.5)
MCHC RBC AUTO-ENTMCNC: 31.7 G/DL
MCHC RBC AUTO-ENTMCNC: 31.9 GM/DL (ref 32–36.5)
MCHC RBC AUTO-ENTMCNC: 32 GM/DL (ref 32–36.5)
MCHC RBC AUTO-ENTMCNC: 32.2 G/DL (ref 32–36.5)
MCHC RBC AUTO-ENTMCNC: 32.3 GM/DL (ref 32–36.5)
MCHC RBC AUTO-ENTMCNC: 32.3 GM/DL (ref 32–36.5)
MCHC RBC AUTO-ENTMCNC: 32.4 G/DL (ref 32–36.5)
MCHC RBC AUTO-ENTMCNC: 32.5 GM/DL (ref 32–36.5)
MCHC RBC AUTO-ENTMCNC: 32.6 G/DL (ref 32–36.5)
MCHC RBC AUTO-ENTMCNC: 32.6 G/DL (ref 32–36.5)
MCHC RBC AUTO-ENTMCNC: 32.6 GM/DL (ref 32–36.5)
MCHC RBC AUTO-ENTMCNC: 32.6 GM/DL (ref 32–36.5)
MCHC RBC AUTO-ENTMCNC: 32.8 G/DL (ref 32–36.5)
MCHC RBC AUTO-ENTMCNC: 32.8 G/DL (ref 32–36.5)
MCHC RBC AUTO-ENTMCNC: 32.9 GM/DL (ref 32–36.5)
MCHC RBC AUTO-ENTMCNC: 32.9 GM/DL (ref 32–36.5)
MCHC RBC AUTO-ENTMCNC: 33 G/DL (ref 32–36.5)
MCHC RBC AUTO-ENTMCNC: 33.1 G/DL (ref 32–36.5)
MCHC RBC AUTO-ENTMCNC: 33.2 G/DL (ref 32–36.5)
MCHC RBC AUTO-ENTMCNC: 33.2 G/DL (ref 32–36.5)
MCHC RBC AUTO-ENTMCNC: 33.2 GM/DL (ref 32–36.5)
MCHC RBC AUTO-ENTMCNC: 33.4 G/DL (ref 32–36.5)
MCHC RBC AUTO-ENTMCNC: 33.5 G/DL (ref 32–36.5)
MCHC RBC AUTO-ENTMCNC: 33.5 G/DL (ref 32–36.5)
MCHC RBC AUTO-ENTMCNC: 33.7 G/DL (ref 31–37)
MCHC RBC AUTO-ENTMCNC: 33.8 G/DL (ref 32–36.5)
MCHC RBC AUTO-ENTMCNC: 33.8 G/DL (ref 32–36.5)
MCHC RBC AUTO-ENTMCNC: 33.9 G/DL (ref 32–36.5)
MCHC RBC AUTO-ENTMCNC: 34 G/DL (ref 31–37)
MCHC RBC AUTO-ENTMCNC: 34.1 G/DL (ref 32–36.5)
MCHC RBC AUTO-ENTMCNC: 34.4 G/DL (ref 31–37)
MCV RBC AUTO: 101.1 FL
MCV RBC AUTO: 84 FL (ref 80–100)
MCV RBC AUTO: 84.8 FL (ref 80–100)
MCV RBC AUTO: 85.1 FL (ref 80–100)
MCV RBC AUTO: 85.4 FL (ref 78–100)
MCV RBC AUTO: 85.5 FL (ref 80–100)
MCV RBC AUTO: 85.9 FL (ref 80–100)
MCV RBC AUTO: 86.3 FL (ref 78–100)
MCV RBC AUTO: 86.4 FL (ref 78–100)
MCV RBC AUTO: 86.5 FL (ref 78–100)
MCV RBC AUTO: 86.8 FL (ref 78–100)
MCV RBC AUTO: 86.9 FL (ref 78–100)
MCV RBC AUTO: 86.9 FL (ref 78–100)
MCV RBC AUTO: 87 FL (ref 78–100)
MCV RBC AUTO: 87.1 FL (ref 78–100)
MCV RBC AUTO: 87.2 FL (ref 78–100)
MCV RBC AUTO: 87.4 FL (ref 78–100)
MCV RBC AUTO: 87.8 FL (ref 78–100)
MCV RBC AUTO: 88.1 FL (ref 78–100)
MCV RBC AUTO: 88.1 FL (ref 78–100)
MCV RBC AUTO: 89.1 FL (ref 78–100)
MCV RBC AUTO: 91.7 FL (ref 78–100)
MCV RBC AUTO: 92.8 FL (ref 78–100)
MCV RBC AUTO: 92.9 FL (ref 78–100)
MCV RBC AUTO: 93.1 FL (ref 78–100)
MCV RBC AUTO: 93.5 FL (ref 78–100)
MCV RBC AUTO: 93.7 FL (ref 78–100)
MCV RBC AUTO: 93.8 FL (ref 78–100)
MCV RBC AUTO: 93.9 FL (ref 78–100)
MCV RBC AUTO: 93.9 FL (ref 78–100)
MCV RBC AUTO: 94 FL (ref 78–100)
MCV RBC AUTO: 94 FL (ref 78–100)
MCV RBC AUTO: 94.2 FL (ref 78–100)
MCV RBC AUTO: 95 FL (ref 78–100)
MCV RBC AUTO: 95.6 FL (ref 78–100)
MCV RBC AUTO: 96.6 FL (ref 78–100)
MCV RBC AUTO: 96.8 FL (ref 78–100)
MCV RBC AUTO: 96.9 FL (ref 78–100)
MCV RBC AUTO: 97.2 FL (ref 78–100)
MCV RBC AUTO: 97.5 FL (ref 78–100)
METAMYELOCYTES # BLD: 0.04 X10(3) UL
METAMYELOCYTES # BLD: 0.07 X10(3) UL
METAMYELOCYTES # BLD: 0.09 X10(3) UL
METAMYELOCYTES NFR BLD: 1 %
METAMYELOCYTES NFR BLD: 1 % (ref 0–2)
METAMYELOCYTES NFR BLD: 1 % (ref 0–2)
METAMYELOCYTES NFR BLD: 2 % (ref 0–2)
METAMYELOCYTES NFR BLD: 2 % (ref 0–2)
METAMYELOCYTES NFR BLD: 3 % (ref 0–2)
METAMYELOCYTES NFR BLD: 3 % (ref 0–2)
METAMYELOCYTES NFR BLD: 4 % (ref 0–2)
METAPNEUMOVIRUS (RMETA): NOT DETECTED
MICROCYTOSIS (MICY): ABNORMAL
MIXED CELL CASTS #/AREA URNS LPF: ABNORMAL /[LPF]
MIXED CELL CASTS #/AREA URNS LPF: ABNORMAL /[LPF]
MIXED CELL CASTS #/AREA URNS LPF: NORMAL /[LPF]
MONOCYTES # BLD: 0 K/MCL (ref 0.3–0.9)
MONOCYTES # BLD: 0 THOUSAND/MCL (ref 0.3–0.9)
MONOCYTES # BLD: 0 THOUSAND/MCL (ref 0.3–0.9)
MONOCYTES # BLD: 0.1 K/MCL (ref 0.3–0.9)
MONOCYTES # BLD: 0.1 THOUSAND/MCL (ref 0.3–0.9)
MONOCYTES # BLD: 0.17 X10(3) UL (ref 0.1–1)
MONOCYTES # BLD: 0.2 THOUSAND/MCL (ref 0.3–0.9)
MONOCYTES # BLD: 0.2 THOUSAND/MCL (ref 0.3–0.9)
MONOCYTES # BLD: 0.23 X10(3) UL (ref 0.1–1)
MONOCYTES # BLD: 0.26 X10(3) UL (ref 0.1–1)
MONOCYTES # BLD: 0.3 K/MCL (ref 0.3–0.9)
MONOCYTES # BLD: 0.3 K/MCL (ref 0.3–0.9)
MONOCYTES # BLD: 0.3 THOUSAND/MCL (ref 0.3–0.9)
MONOCYTES # BLD: 0.34 X10(3) UL (ref 0.1–1)
MONOCYTES # BLD: 0.4 K/MCL (ref 0.3–0.9)
MONOCYTES # BLD: 0.4 THOUSAND/MCL (ref 0.3–0.9)
MONOCYTES # BLD: 0.5 K/MCL (ref 0.3–0.9)
MONOCYTES # BLD: 0.5 THOUSAND/MCL (ref 0.3–0.9)
MONOCYTES # BLD: 0.6 K/MCL (ref 0.3–0.9)
MONOCYTES # BLD: 0.6 K/MCL (ref 0.3–0.9)
MONOCYTES # BLD: 1 K/MCL (ref 0.3–0.9)
MONOCYTES # BLD: 1.17 X10(3) UL (ref 0.1–1)
MONOCYTES # BLD: ABNORMAL 10*3/UL
MONOCYTES # BLD: NORMAL 10*3/UL
MONOCYTES NFR BLD: 0 %
MONOCYTES NFR BLD: 10 %
MONOCYTES NFR BLD: 13 %
MONOCYTES NFR BLD: 15 %
MONOCYTES NFR BLD: 16 %
MONOCYTES NFR BLD: 17 %
MONOCYTES NFR BLD: 2 %
MONOCYTES NFR BLD: 2 %
MONOCYTES NFR BLD: 3 %
MONOCYTES NFR BLD: 4 %
MONOCYTES NFR BLD: 5 %
MONOCYTES NFR BLD: 6 %
MONOCYTES NFR BLD: 6 %
MONOCYTES NFR BLD: 7 %
MONOCYTES NFR BLD: 8 %
MONOCYTES NFR BLD: 9 %
MONOCYTES NFR BLD: 9 %
MONOCYTES NFR BLD: ABNORMAL %
MONOCYTES NFR BLD: NORMAL %
MORPHOLOGY: NORMAL
MPV (OFFPRE2): NORMAL
MRSA DNA SPEC QL NAA+PROBE: NORMAL
MRSA DNA SPEC QL NAA+PROBE: NOT DETECTED
MRSA DNA SPEC QL NAA+PROBE: NOT DETECTED
MUCOUS THREADS URNS QL MICRO: PRESENT
MYELOCYTES # BLD: 0.41 X10(3) UL
MYELOCYTES NFR BLD: 6 %
NEUTROPHILS # BLD AUTO: 0.94 X10 (3) UL (ref 1.5–7.7)
NEUTROPHILS # BLD AUTO: 2.24 X10 (3) UL (ref 1.5–7.7)
NEUTROPHILS # BLD AUTO: 2.26 X10 (3) UL (ref 1.5–7.7)
NEUTROPHILS # BLD AUTO: 3.87 X10 (3) UL (ref 1.5–7.7)
NEUTROPHILS # BLD AUTO: 4.93 X10 (3) UL (ref 1.5–7.7)
NEUTROPHILS # BLD: 0.2 K/MCL (ref 1.8–7.7)
NEUTROPHILS # BLD: 0.3 K/MCL (ref 1.8–7.7)
NEUTROPHILS # BLD: 0.4 K/MCL (ref 1.8–7.7)
NEUTROPHILS # BLD: 0.7 THOUSAND/MCL (ref 1.8–7.7)
NEUTROPHILS # BLD: 0.8 K/MCL (ref 1.8–7.7)
NEUTROPHILS # BLD: 1.4 THOUSAND/MCL (ref 1.8–7.7)
NEUTROPHILS # BLD: 1.4 THOUSAND/MCL (ref 1.8–7.7)
NEUTROPHILS # BLD: 2.8 THOUSAND/MCL (ref 1.8–7.7)
NEUTROPHILS # BLD: 2.9 K/MCL (ref 1.8–7.7)
NEUTROPHILS # BLD: 3.5 THOUSAND/MCL (ref 1.8–7.7)
NEUTROPHILS # BLD: 3.7 THOUSAND/MCL (ref 1.8–7.7)
NEUTROPHILS # BLD: 4.1 K/MCL (ref 1.8–7.7)
NEUTROPHILS # BLD: 4.3 K/MCL (ref 1.8–7.7)
NEUTROPHILS # BLD: 4.5 THOUSAND/MCL (ref 1.8–7.7)
NEUTROPHILS # BLD: 5.5 THOUSAND/MCL (ref 1.8–7.7)
NEUTROPHILS # BLD: 6.2 K/MCL (ref 1.8–7.7)
NEUTROPHILS # BLD: 6.3 K/MCL (ref 1.8–7.7)
NEUTROPHILS # BLD: 6.3 THOUSAND/MCL (ref 1.8–7.7)
NEUTROPHILS # BLD: 6.5 K/MCL (ref 1.8–7.7)
NEUTROPHILS # BLD: 7.1 K/MCL (ref 1.8–7.7)
NEUTROPHILS # BLD: 7.4 K/MCL (ref 1.8–7.7)
NEUTROPHILS # BLD: 8.4 K/MCL (ref 1.8–7.7)
NEUTROPHILS # BLD: 8.9 THOUSAND/MCL (ref 1.8–7.7)
NEUTROPHILS # BLD: 9.6 K/MCL (ref 1.8–7.7)
NEUTROPHILS # BLD: ABNORMAL 10*3/UL
NEUTROPHILS # BLD: NORMAL 10*3/UL
NEUTROPHILS NFR BLD: 25 %
NEUTROPHILS NFR BLD: 39 %
NEUTROPHILS NFR BLD: 43 %
NEUTROPHILS NFR BLD: 50 %
NEUTROPHILS NFR BLD: 62 %
NEUTROPHILS NFR BLD: 64 %
NEUTROPHILS NFR BLD: 70 %
NEUTROPHILS NFR BLD: 84 %
NEUTROPHILS NFR BLD: 85 %
NEUTROPHILS NFR BLD: 87 %
NEUTROPHILS NFR BLD: 89 %
NEUTROPHILS NFR BLD: 89 %
NEUTROPHILS NFR BLD: 92 %
NEUTROPHILS NFR BLD: 92 %
NEUTROPHILS NFR BLD: ABNORMAL %
NEUTROPHILS NFR BLD: NORMAL %
NEUTS BAND NFR BLD: 1 % (ref 0–10)
NEUTS BAND NFR BLD: 10 % (ref 0–10)
NEUTS BAND NFR BLD: 10 % (ref 0–10)
NEUTS BAND NFR BLD: 13 % (ref 0–10)
NEUTS BAND NFR BLD: 15 % (ref 0–10)
NEUTS BAND NFR BLD: 19 %
NEUTS BAND NFR BLD: 2 %
NEUTS BAND NFR BLD: 2 % (ref 0–10)
NEUTS BAND NFR BLD: 20 %
NEUTS BAND NFR BLD: 3 % (ref 0–10)
NEUTS BAND NFR BLD: 3 % (ref 0–10)
NEUTS BAND NFR BLD: 4 %
NEUTS BAND NFR BLD: 4 % (ref 0–10)
NEUTS BAND NFR BLD: 4 % (ref 0–10)
NEUTS BAND NFR BLD: 5 %
NEUTS BAND NFR BLD: 5 % (ref 0–10)
NEUTS BAND NFR BLD: 6 % (ref 0–10)
NEUTS BAND NFR BLD: 6 % (ref 0–10)
NEUTS BAND NFR BLD: 9 % (ref 0–10)
NEUTS HYPERSEG # BLD: 0.85 X10(3) UL (ref 1.5–7.7)
NEUTS HYPERSEG # BLD: 2.66 X10(3) UL (ref 1.5–7.7)
NEUTS HYPERSEG # BLD: 2.9 X10(3) UL (ref 1.5–7.7)
NEUTS HYPERSEG # BLD: 4.69 X10(3) UL (ref 1.5–7.7)
NEUTS HYPERSEG # BLD: 6.48 X10(3) UL (ref 1.5–7.7)
NEUTS SEG NFR BLD: 20 %
NEUTS SEG NFR BLD: 40 %
NEUTS SEG NFR BLD: 45 %
NEUTS SEG NFR BLD: 52 %
NEUTS SEG NFR BLD: 55 %
NEUTS SEG NFR BLD: 61 %
NEUTS SEG NFR BLD: 63 %
NEUTS SEG NFR BLD: 63 %
NEUTS SEG NFR BLD: 66 %
NEUTS SEG NFR BLD: 69 %
NEUTS SEG NFR BLD: 75 %
NEUTS SEG NFR BLD: 81 %
NEUTS SEG NFR BLD: 82 %
NEUTS SEG NFR BLD: 88 %
NEUTS SEG NFR BLD: 91 %
NEUTS SEG NFR BLD: ABNORMAL %
NITRITE UR QL STRIP.AUTO: NEGATIVE
NITRITE UR QL: NEGATIVE
NONHDLC SERPL-MCNC: 41 MG/DL
NRBC (NRBCRE): 0 /100 WBC
NRBC (NRBCRE): 1 /100 WBC
NRBC (NRBCRE): 2 /100 WBC
NRBC (NRBCRE): 3 /100 WBC
NRBC (NRBCRE): 6 /100 WBC
NRBC (NRBCRE): 8 /100 WBC
NRBC BLD MANUAL-RTO: 1 %
NRBC BLD MANUAL-RTO: 3 %
NRBC BLD MANUAL-RTO: NORMAL %
NT-PROBNP SERPL-MCNC: 395 PG/ML
NT-PROBNP SERPL-MCNC: 561 PG/ML
NT-PROBNP SERPL-MCNC: 630 PG/ML
ORGANIC ACIDS/CREAT UR-SRTO: 119.84 MG/DL
OSMOLALITY SERPL CALC.SUM OF ELEC: 289 MOSM/KG (ref 275–295)
OSMOLALITY SERPL CALC.SUM OF ELEC: 290 MOSM/KG (ref 275–295)
OSMOLALITY SERPL CALC.SUM OF ELEC: 290 MOSM/KG (ref 275–295)
OSMOLALITY SERPL CALC.SUM OF ELEC: 293 MOSM/KG (ref 275–295)
OSMOLALITY UR: 483 MOSM/KG (ref 50–1200)
OVALOCYTES (OVALO): ABNORMAL
P AXIS: 66 DEGREES
P-R INTERVAL: 188 MS
PARAINFLUENZA, TYPE 1 (RPAR1): NOT DETECTED
PARAINFLUENZA, TYPE 2 (RPAR2): NOT DETECTED
PARAINFLUENZA, TYPE 3 (RPAR3): NOT DETECTED
PARAINFLUENZA, TYPE 4 (RPAR4): NOT DETECTED
PATH REV BLD -IMP: ABNORMAL
PH UR STRIP.AUTO: 8 [PH] (ref 4.5–8)
PH UR: 5 UNIT (ref 5–7)
PH UR: 5 UNIT (ref 5–7)
PH UR: 5 UNITS (ref 5–7)
PHOSPHATE SERPL-MCNC: 2.3 MG/DL (ref 2.4–4.7)
PHOSPHATE SERPL-MCNC: 2.4 MG/DL (ref 2.4–4.7)
PHOSPHATE SERPL-MCNC: 2.7 MG/DL (ref 2.4–4.7)
PHOSPHATE SERPL-MCNC: 3.4 MG/DL (ref 2.4–4.7)
PHOSPHATE SERPL-MCNC: 4.1 MG/DL (ref 2.4–4.7)
PLAT MORPH BLD: NORMAL
PLATELET # BLD AUTO: 12 10(3)UL (ref 150–450)
PLATELET # BLD AUTO: 12 10(3)UL (ref 150–450)
PLATELET # BLD AUTO: 14 10(3)UL (ref 150–450)
PLATELET # BLD AUTO: 20 10(3)UL (ref 150–450)
PLATELET # BLD AUTO: 25 10(3)UL (ref 150–450)
PLATELET # BLD: 10 THOUSAND/MCL (ref 140–450)
PLATELET # BLD: 102 K/MCL (ref 140–450)
PLATELET # BLD: 102 K/MCL (ref 140–450)
PLATELET # BLD: 108 THOUSAND/MCL (ref 140–450)
PLATELET # BLD: 115 K/MCL (ref 140–450)
PLATELET # BLD: 12 K/MCL (ref 140–450)
PLATELET # BLD: 13 K/MCL (ref 140–450)
PLATELET # BLD: 13 K/MCL (ref 140–450)
PLATELET # BLD: 13 THOUSAND/MCL (ref 140–450)
PLATELET # BLD: 134 K/MCL (ref 140–450)
PLATELET # BLD: 139 K/MCL (ref 140–450)
PLATELET # BLD: 142 K/MCL (ref 140–450)
PLATELET # BLD: 143 K/MCL (ref 140–450)
PLATELET # BLD: 143 K/MCL (ref 140–450)
PLATELET # BLD: 16 K/MCL (ref 140–450)
PLATELET # BLD: 16 K/MCL (ref 140–450)
PLATELET # BLD: 16 THOUSAND/MCL (ref 140–450)
PLATELET # BLD: 17 THOUSAND/MCL (ref 140–450)
PLATELET # BLD: 18 THOUSAND/MCL (ref 140–450)
PLATELET # BLD: 18 THOUSAND/MCL (ref 140–450)
PLATELET # BLD: 19 K/MCL (ref 140–450)
PLATELET # BLD: 19 K/MCL (ref 140–450)
PLATELET # BLD: 20 K/MCL (ref 140–450)
PLATELET # BLD: 20 THOUSAND/MCL (ref 140–450)
PLATELET # BLD: 22 K/MCL (ref 140–450)
PLATELET # BLD: 23 THOUSAND/MCL (ref 140–450)
PLATELET # BLD: 31 K/MCL (ref 140–450)
PLATELET # BLD: 31 THOUSAND/MCL (ref 140–450)
PLATELET # BLD: 33 THOUSAND/MCL (ref 140–450)
PLATELET # BLD: 55 THOUSAND/MCL (ref 140–450)
PLATELET # BLD: 58 THOUSAND/MCL (ref 140–450)
PLATELET # BLD: 72 THOUSAND/MCL (ref 140–450)
PLATELET # BLD: 79 10*3/UL
PLATELET # BLD: 8 K/MCL (ref 140–450)
PLATELET # BLD: 84 THOUSAND/MCL (ref 140–450)
PLATELET # BLD: 93 K/MCL (ref 140–450)
PLATELET # BLD: ABNORMAL 10*3/UL
PLATELET MORPHOLOGY: NORMAL
POLYCHROMASIA (POLY): ABNORMAL
POTASSIUM SERPL-SCNC: 3.4 MMOL/L (ref 3.4–5.1)
POTASSIUM SERPL-SCNC: 3.4 MMOL/L (ref 3.4–5.1)
POTASSIUM SERPL-SCNC: 3.5 MMOL/L (ref 3.4–5.1)
POTASSIUM SERPL-SCNC: 3.5 MMOL/L (ref 3.6–5.1)
POTASSIUM SERPL-SCNC: 3.6 MMOL/L (ref 3.4–5.1)
POTASSIUM SERPL-SCNC: 3.6 MMOL/L (ref 3.4–5.1)
POTASSIUM SERPL-SCNC: 3.7 MMOL/L (ref 3.4–5.1)
POTASSIUM SERPL-SCNC: 3.7 MMOL/L (ref 3.4–5.1)
POTASSIUM SERPL-SCNC: 3.8 MMOL/L (ref 3.4–5.1)
POTASSIUM SERPL-SCNC: 3.8 MMOL/L (ref 3.4–5.1)
POTASSIUM SERPL-SCNC: 3.9 MMOL/L (ref 3.4–5.1)
POTASSIUM SERPL-SCNC: 3.9 MMOL/L (ref 3.6–5.1)
POTASSIUM SERPL-SCNC: 4 MMOL/L (ref 3.4–5.1)
POTASSIUM SERPL-SCNC: 4 MMOL/L (ref 3.6–5.1)
POTASSIUM SERPL-SCNC: 4.1 MMOL/L (ref 3.4–5.1)
POTASSIUM SERPL-SCNC: 4.2 MMOL/L (ref 3.4–5.1)
POTASSIUM SERPL-SCNC: 4.3 MMOL/L (ref 3.4–5.1)
POTASSIUM SERPL-SCNC: 4.3 MMOL/L (ref 3.6–5.1)
POTASSIUM SERPL-SCNC: 4.3 MMOL/L (ref 3.6–5.1)
POTASSIUM SERPL-SCNC: 4.4 MMOL/L (ref 3.4–5.1)
POTASSIUM SERPL-SCNC: 4.5 MMOL/L
POTASSIUM SERPL-SCNC: 4.6 MMOL/L (ref 3.4–5.1)
POTASSIUM SERPL-SCNC: 4.6 MMOL/L (ref 3.4–5.1)
POTASSIUM SERPL-SCNC: 5 MMOL/L (ref 3.4–5.1)
PROCALCITONIN SERPL IA-MCNC: 0.25 NG/ML
PROCALCITONIN SERPL IA-MCNC: 0.32 NG/ML
PROCALCITONIN SERPL IA-MCNC: 0.6 NG/ML
PROCALCITONIN SERPL IA-MCNC: 2.86 NG/ML
PROCALCITONIN SERPL IA-MCNC: ABNORMAL NG/ML
PROT SERPL-MCNC: 5.2 G/DL (ref 6.4–8.2)
PROT SERPL-MCNC: 5.3 G/DL (ref 6.4–8.2)
PROT SERPL-MCNC: 5.4 G/DL (ref 6.4–8.2)
PROT SERPL-MCNC: 5.5 GM/DL (ref 6.4–8.2)
PROT SERPL-MCNC: 5.5 GM/DL (ref 6.4–8.2)
PROT SERPL-MCNC: 5.6 G/DL (ref 6.4–8.2)
PROT SERPL-MCNC: 5.6 G/DL (ref 6.4–8.2)
PROT SERPL-MCNC: 5.6 GM/DL (ref 6.4–8.2)
PROT SERPL-MCNC: 5.7 G/DL (ref 6.4–8.2)
PROT SERPL-MCNC: 5.8 GM/DL (ref 6.4–8.2)
PROT SERPL-MCNC: 5.9 GM/DL (ref 6.4–8.2)
PROT SERPL-MCNC: 6 GM/DL (ref 6.4–8.2)
PROT SERPL-MCNC: 6.1 GM/DL (ref 6.4–8.2)
PROT SERPL-MCNC: 6.1 GM/DL (ref 6.4–8.2)
PROT SERPL-MCNC: 6.2 GM/DL (ref 6.4–8.2)
PROT SERPL-MCNC: 6.2 GM/DL (ref 6.4–8.2)
PROT SERPL-MCNC: 6.3 G/DL (ref 6.4–8.2)
PROT SERPL-MCNC: 6.5 GM/DL (ref 6.4–8.2)
PROT SERPL-MCNC: 6.6 G/DL
PROT SERPL-MCNC: 6.6 G/DL (ref 6.4–8.2)
PROT SERPL-MCNC: 6.6 GM/DL (ref 6.4–8.2)
PROT SERPL-MCNC: 6.9 G/DL (ref 6.4–8.2)
PROT SERPL-MCNC: 6.9 G/DL (ref 6.4–8.2)
PROT SERPL-MCNC: 7.3 G/DL (ref 6.4–8.2)
PROT UR QL: 100 MG/DL
PROT UR QL: 30 MG/DL
PROT UR QL: NEGATIVE MG/DL
PROT UR STRIP.AUTO-MCNC: NEGATIVE MG/DL
PROTHROMBIN TIME (PRT2): NORMAL
PROTHROMBIN TIME: 11.6 SEC (ref 9.7–11.8)
PROTHROMBIN TIME: 15.5 SECONDS (ref 9.7–11.8)
PROTHROMBIN TIME: ABNORMAL
Q-T INTERVAL: 412 MS
QRS DURATION: 94 MS
QTC CALCULATION (BEZET): 484 MS
R AXIS: -30 DEGREES
RBC # BLD AUTO: 2.1 X10(6)UL (ref 3.8–5.8)
RBC # BLD AUTO: 2.41 X10(6)UL (ref 3.8–5.8)
RBC # BLD AUTO: 2.56 X10(6)UL (ref 3.8–5.8)
RBC # BLD AUTO: 2.83 X10(6)UL (ref 3.8–5.8)
RBC # BLD AUTO: 2.89 X10(6)UL (ref 3.8–5.8)
RBC # BLD: 1.77 MIL/MCL (ref 4.5–5.9)
RBC # BLD: 2.15 MIL/MCL (ref 4.5–5.9)
RBC # BLD: 2.21 MILLION/MCL (ref 4.5–5.9)
RBC # BLD: 2.25 MIL/MCL (ref 4.5–5.9)
RBC # BLD: 2.43 MILLION/MCL (ref 4.5–5.9)
RBC # BLD: 2.47 MILLION/MCL (ref 4.5–5.9)
RBC # BLD: 2.49 MILLION/MCL (ref 4.5–5.9)
RBC # BLD: 2.53 MIL/MCL (ref 4.5–5.9)
RBC # BLD: 2.54 MIL/MCL (ref 4.5–5.9)
RBC # BLD: 2.55 MIL/MCL (ref 4.5–5.9)
RBC # BLD: 2.58 MILLION/MCL (ref 4.5–5.9)
RBC # BLD: 2.6 MIL/MCL (ref 4.5–5.9)
RBC # BLD: 2.63 MILLION/MCL (ref 4.5–5.9)
RBC # BLD: 2.65 MILLION/MCL (ref 4.5–5.9)
RBC # BLD: 2.65 MILLION/MCL (ref 4.5–5.9)
RBC # BLD: 2.67 MILLION/MCL (ref 4.5–5.9)
RBC # BLD: 2.68 MILLION/MCL (ref 4.5–5.9)
RBC # BLD: 2.78 10*6/UL
RBC # BLD: 2.78 MIL/MCL (ref 4.5–5.9)
RBC # BLD: 2.79 MIL/MCL (ref 4.5–5.9)
RBC # BLD: 2.81 MILLION/MCL (ref 4.5–5.9)
RBC # BLD: 2.86 MILLION/MCL (ref 4.5–5.9)
RBC # BLD: 2.88 MILLION/MCL (ref 4.5–5.9)
RBC # BLD: 2.93 MILLION/MCL (ref 4.5–5.9)
RBC # BLD: 2.95 MILLION/MCL (ref 4.5–5.9)
RBC # BLD: 3.14 MIL/MCL (ref 4.5–5.9)
RBC # BLD: 3.55 MIL/MCL (ref 4.5–5.9)
RBC # BLD: 3.81 MIL/MCL (ref 4.5–5.9)
RBC # BLD: 3.83 MIL/MCL (ref 4.5–5.9)
RBC # BLD: 3.86 MIL/MCL (ref 4.5–5.9)
RBC # BLD: 3.91 MIL/MCL (ref 4.5–5.9)
RBC # BLD: 3.94 MIL/MCL (ref 4.5–5.9)
RBC # BLD: 4 MIL/MCL (ref 4.5–5.9)
RBC # BLD: 4.12 MIL/MCL (ref 4.5–5.9)
RBC # BLD: 4.24 MIL/MCL (ref 4.5–5.9)
RBC #/AREA URNS HPF: ABNORMAL /HPF (ref 0–2)
RBC #/AREA URNS HPF: ABNORMAL /HPF (ref 0–3)
RBC #/AREA URNS HPF: NORMAL /HPF (ref 0–3)
RBC CASTS #/AREA URNS LPF: ABNORMAL /[LPF]
RBC CASTS #/AREA URNS LPF: ABNORMAL /[LPF]
RBC CASTS #/AREA URNS LPF: NORMAL /[LPF]
RBC MORPH BLD: NORMAL
RBC MORPH BLD: NORMAL
RBC UR QL AUTO: NEGATIVE
RENAL EPI CELLS #/AREA URNS HPF: ABNORMAL /[HPF]
RENAL EPI CELLS #/AREA URNS HPF: ABNORMAL /[HPF]
RENAL EPI CELLS #/AREA URNS HPF: NORMAL /[HPF]
RH BLOOD TYPE: POSITIVE
RHINOVIRUS/ENTEROVIRUS (RRHINO): NOT DETECTED
RSV, SUBTYPE A (RRSVA): NOT DETECTED
RSV, SUBTYPE B (RRSVB): NOT DETECTED
SERVICE CMNT-IMP: NORMAL
SERVICE CMNT-IMP: NORMAL
SHISTOCYTES (SHSTO): ABNORMAL
SODIUM SERPL-SCNC: 136 MMOL/L (ref 135–145)
SODIUM SERPL-SCNC: 137 MMOL/L (ref 135–145)
SODIUM SERPL-SCNC: 138 MMOL/L (ref 135–145)
SODIUM SERPL-SCNC: 138 MMOL/L (ref 136–144)
SODIUM SERPL-SCNC: 139 MMOL/L (ref 135–145)
SODIUM SERPL-SCNC: 140 MMOL/L (ref 135–145)
SODIUM SERPL-SCNC: 140 MMOL/L (ref 136–144)
SODIUM SERPL-SCNC: 141 MMOL/L (ref 135–145)
SODIUM SERPL-SCNC: 141 MMOL/L (ref 136–144)
SODIUM SERPL-SCNC: 142 MMOL/L (ref 135–145)
SODIUM SERPL-SCNC: 142 MMOL/L (ref 136–144)
SODIUM SERPL-SCNC: 143 MMOL/L
SODIUM SERPL-SCNC: 143 MMOL/L (ref 135–145)
SODIUM SERPL-SCNC: 144 MMOL/L (ref 135–145)
SODIUM SERPL-SCNC: 144 MMOL/L (ref 135–145)
SODIUM SERPL-SCNC: 145 MMOL/L (ref 135–145)
SODIUM UR-SCNC: 39 MMOL/L
SP GR UR STRIP.AUTO: 1.01 (ref 1–1.03)
SP GR UR: 1.01 (ref 1–1.03)
SP GR UR: 1.02 (ref 1–1.03)
SP GR UR: 1.02 (ref 1–1.03)
SPECIMEN SOURCE: ABNORMAL
SPECIMEN SOURCE: ABNORMAL
SPECIMEN SOURCE: NORMAL
SPERM URNS QL MICRO: ABNORMAL
SPERM URNS QL MICRO: ABNORMAL
SPERM URNS QL MICRO: NORMAL
SQUAMOUS #/AREA URNS HPF: ABNORMAL /HPF (ref 0–5)
SQUAMOUS #/AREA URNS HPF: ABNORMAL /HPF (ref 0–5)
SQUAMOUS #/AREA URNS HPF: NORMAL /HPF (ref 0–5)
T AXIS: 13 DEGREES
T VAGINALIS URNS QL MICRO: ABNORMAL
T VAGINALIS URNS QL MICRO: ABNORMAL
T VAGINALIS URNS QL MICRO: NORMAL
T4 FREE SERPL-MCNC: 0.9 NG/DL (ref 0.8–1.5)
TEAR DROP CELLS (TEARD): ABNORMAL
TIBC SERPL-MCNC: 161 MCG/DL (ref 250–450)
TOTAL CELLS COUNTED: 100
TOTAL CELLS COUNTED: 68
TOXIC GRANULATION (TOXIC): PRESENT
TOXIC GRANULES BLD QL SMEAR: PRESENT
TOXIC GRANULES BLD QL SMEAR: PRESENT
TOXIC VACUOLATION (TOXV): PRESENT
TRI-PHOS CRY URNS QL MICRO: ABNORMAL
TRI-PHOS CRY URNS QL MICRO: ABNORMAL
TRI-PHOS CRY URNS QL MICRO: NORMAL
TRIGL SERPL-MCNC: 92 MG/DL
TROPONIN I SERPL HS-MCNC: 0.04 NG/ML
TROPONIN I SERPL HS-MCNC: 0.05 NG/ML
TROPONIN I SERPL HS-MCNC: 0.06 NG/ML
TROPONIN I SERPL HS-MCNC: 0.06 NG/ML
TROPONIN I SERPL HS-MCNC: <0.02 NG/ML
TROPONIN I SERPL HS-MCNC: ABNORMAL NG/L
TROPONIN I SERPL-MCNC: <0.046 NG/ML (ref ?–0.05)
TSH SERPL-ACNC: 0.5 MCUNITS/ML (ref 0.35–5)
URATE CRY URNS QL MICRO: ABNORMAL
URATE CRY URNS QL MICRO: ABNORMAL
URATE CRY URNS QL MICRO: NORMAL
URINE REFLEX: ABNORMAL
URINE REFLEX: ABNORMAL
URINE REFLEX: NORMAL
URNS CMNT MICRO: ABNORMAL
URNS CMNT MICRO: ABNORMAL
URNS CMNT MICRO: NORMAL
UROBILINOGEN UR QL: 0.2 MG/DL (ref 0–1)
UROBILINOGEN UR QL: 0.2 MG/DL (ref 0–1)
UROBILINOGEN UR QL: 2 MG/DL (ref 0–1)
UROBILINOGEN UR STRIP.AUTO-MCNC: <2 MG/DL
VANCOMYCIN TROUGH SERPL-MCNC: 14.7 MCG/ML (ref 10–20)
VANCOMYCIN TROUGH SERPL-MCNC: 18.5 MCG/ML (ref 10–20)
VANCOMYCIN TROUGH SERPL-MCNC: NORMAL UG/ML
VANCOMYCIN TROUGH SERPL-MCNC: NORMAL UG/ML
VENTRICULAR RATE: 83 BPM
VIT B12 SERPL-MCNC: >2000 PG/ML (ref 211–911)
VITAMIN D, 1, 25-DIHYDROXY: 13.5 PG/ML (ref 19.9–79.3)
VLDLC SERPL CALC-MCNC: NORMAL MG/DL
WAXY CASTS #/AREA URNS LPF: ABNORMAL /[LPF]
WAXY CASTS #/AREA URNS LPF: ABNORMAL /[LPF]
WAXY CASTS #/AREA URNS LPF: NORMAL /[LPF]
WBC # BLD AUTO: 1.8 X10(3) UL (ref 4–11)
WBC # BLD AUTO: 4.2 X10(3) UL (ref 4–11)
WBC # BLD AUTO: 4.5 X10(3) UL (ref 4–11)
WBC # BLD AUTO: 6.8 X10(3) UL (ref 4–11)
WBC # BLD AUTO: 9 X10(3) UL (ref 4–11)
WBC # BLD: 0.2 THOUSAND/MCL (ref 4.2–11)
WBC # BLD: 0.3 K/MCL (ref 4.2–11)
WBC # BLD: 0.3 THOUSAND/MCL (ref 4.2–11)
WBC # BLD: 0.4 K/MCL (ref 4.2–11)
WBC # BLD: 0.4 THOUSAND/MCL (ref 4.2–11)
WBC # BLD: 0.5 K/MCL (ref 4.2–11)
WBC # BLD: 0.5 K/MCL (ref 4.2–11)
WBC # BLD: 0.5 THOUSAND/MCL (ref 4.2–11)
WBC # BLD: 0.6 K/MCL (ref 4.2–11)
WBC # BLD: 0.8 K/MCL (ref 4.2–11)
WBC # BLD: 1.1 K/MCL (ref 4.2–11)
WBC # BLD: 1.2 THOUSAND/MCL (ref 4.2–11)
WBC # BLD: 1.3 K/MCL (ref 4.2–11)
WBC # BLD: 1.3 THOUSAND/MCL (ref 4.2–11)
WBC # BLD: 10.1 THOUSAND/MCL (ref 4.2–11)
WBC # BLD: 10.5 K/MCL (ref 4.2–11)
WBC # BLD: 2.1 THOUSAND/MCL (ref 4.2–11)
WBC # BLD: 2.6 THOUSAND/MCL (ref 4.2–11)
WBC # BLD: 3.4 K/MCL (ref 4.2–11)
WBC # BLD: 3.8 THOUSAND/MCL (ref 4.2–11)
WBC # BLD: 4.4 K/MCL (ref 4.2–11)
WBC # BLD: 4.8 THOUSAND/MCL (ref 4.2–11)
WBC # BLD: 5.1 THOUSAND/MCL (ref 4.2–11)
WBC # BLD: 5.4 K/MCL (ref 4.2–11)
WBC # BLD: 5.6 10*3/UL
WBC # BLD: 6 THOUSAND/MCL (ref 4.2–11)
WBC # BLD: 6.1 THOUSAND/MCL (ref 4.2–11)
WBC # BLD: 6.5 K/MCL (ref 4.2–11)
WBC # BLD: 6.8 THOUSAND/MCL (ref 4.2–11)
WBC # BLD: 7.3 K/MCL (ref 4.2–11)
WBC # BLD: 7.4 K/MCL (ref 4.2–11)
WBC # BLD: 7.4 K/MCL (ref 4.2–11)
WBC # BLD: 8.1 K/MCL (ref 4.2–11)
WBC # BLD: 8.3 K/MCL (ref 4.2–11)
WBC # BLD: 9.2 K/MCL (ref 4.2–11)
WBC # BLD: ABNORMAL 10*3/UL
WBC #/AREA URNS HPF: ABNORMAL /HPF (ref 0–5)
WBC #/AREA URNS HPF: ABNORMAL /HPF (ref 0–5)
WBC #/AREA URNS HPF: NORMAL /HPF (ref 0–5)
WBC CASTS #/AREA URNS LPF: ABNORMAL /[LPF]
WBC CASTS #/AREA URNS LPF: ABNORMAL /[LPF]
WBC CASTS #/AREA URNS LPF: NORMAL /[LPF]
WBC MORPH BLD: NORMAL
YEAST HYPHAE URNS QL MICRO: ABNORMAL
YEAST HYPHAE URNS QL MICRO: ABNORMAL
YEAST HYPHAE URNS QL MICRO: NORMAL
YEAST URNS QL MICRO: ABNORMAL
YEAST URNS QL MICRO: ABNORMAL
YEAST URNS QL MICRO: NORMAL

## 2019-01-01 PROCEDURE — 99203 OFFICE O/P NEW LOW 30 MIN: CPT | Performed by: NEUROLOGICAL SURGERY

## 2019-01-01 PROCEDURE — 85025 COMPLETE CBC W/AUTO DIFF WBC: CPT | Performed by: INTERNAL MEDICINE

## 2019-01-01 PROCEDURE — 93005 ELECTROCARDIOGRAM TRACING: CPT

## 2019-01-01 PROCEDURE — 96375 TX/PRO/DX INJ NEW DRUG ADDON: CPT

## 2019-01-01 PROCEDURE — 99214 OFFICE O/P EST MOD 30 MIN: CPT | Performed by: INTERNAL MEDICINE

## 2019-01-01 PROCEDURE — 99285 EMERGENCY DEPT VISIT HI MDM: CPT

## 2019-01-01 PROCEDURE — 85007 BL SMEAR W/DIFF WBC COUNT: CPT | Performed by: INTERNAL MEDICINE

## 2019-01-01 PROCEDURE — 80048 BASIC METABOLIC PNL TOTAL CA: CPT | Performed by: INTERNAL MEDICINE

## 2019-01-01 PROCEDURE — 94640 AIRWAY INHALATION TREATMENT: CPT

## 2019-01-01 PROCEDURE — 85027 COMPLETE CBC AUTOMATED: CPT | Performed by: INTERNAL MEDICINE

## 2019-01-01 PROCEDURE — 99232 SBSQ HOSP IP/OBS MODERATE 35: CPT | Performed by: INTERNAL MEDICINE

## 2019-01-01 PROCEDURE — 99316 NF DSCHRG MGMT 30 MIN+: CPT | Performed by: NURSE PRACTITIONER

## 2019-01-01 PROCEDURE — 85060 BLOOD SMEAR INTERPRETATION: CPT | Performed by: EMERGENCY MEDICINE

## 2019-01-01 PROCEDURE — 85007 BL SMEAR W/DIFF WBC COUNT: CPT | Performed by: EMERGENCY MEDICINE

## 2019-01-01 PROCEDURE — 81003 URINALYSIS AUTO W/O SCOPE: CPT | Performed by: INTERNAL MEDICINE

## 2019-01-01 PROCEDURE — 95816 EEG AWAKE AND DROWSY: CPT

## 2019-01-01 PROCEDURE — 83735 ASSAY OF MAGNESIUM: CPT | Performed by: INTERNAL MEDICINE

## 2019-01-01 PROCEDURE — 85025 COMPLETE CBC W/AUTO DIFF WBC: CPT | Performed by: EMERGENCY MEDICINE

## 2019-01-01 PROCEDURE — 96361 HYDRATE IV INFUSION ADD-ON: CPT

## 2019-01-01 PROCEDURE — 93010 ELECTROCARDIOGRAM REPORT: CPT

## 2019-01-01 PROCEDURE — 71045 X-RAY EXAM CHEST 1 VIEW: CPT | Performed by: EMERGENCY MEDICINE

## 2019-01-01 PROCEDURE — 86850 RBC ANTIBODY SCREEN: CPT | Performed by: EMERGENCY MEDICINE

## 2019-01-01 PROCEDURE — 84132 ASSAY OF SERUM POTASSIUM: CPT | Performed by: INTERNAL MEDICINE

## 2019-01-01 PROCEDURE — 96365 THER/PROPH/DIAG IV INF INIT: CPT

## 2019-01-01 PROCEDURE — 99310 SBSQ NF CARE HIGH MDM 45: CPT | Performed by: NURSE PRACTITIONER

## 2019-01-01 PROCEDURE — 87150 DNA/RNA AMPLIFIED PROBE: CPT | Performed by: EMERGENCY MEDICINE

## 2019-01-01 PROCEDURE — 71046 X-RAY EXAM CHEST 2 VIEWS: CPT | Performed by: EMERGENCY MEDICINE

## 2019-01-01 PROCEDURE — 3079F DIAST BP 80-89 MM HG: CPT | Performed by: INTERNAL MEDICINE

## 2019-01-01 PROCEDURE — 86901 BLOOD TYPING SEROLOGIC RH(D): CPT | Performed by: EMERGENCY MEDICINE

## 2019-01-01 PROCEDURE — 3079F DIAST BP 80-89 MM HG: CPT | Performed by: NEUROLOGICAL SURGERY

## 2019-01-01 PROCEDURE — 85027 COMPLETE CBC AUTOMATED: CPT | Performed by: EMERGENCY MEDICINE

## 2019-01-01 PROCEDURE — 80048 BASIC METABOLIC PNL TOTAL CA: CPT | Performed by: EMERGENCY MEDICINE

## 2019-01-01 PROCEDURE — 84484 ASSAY OF TROPONIN QUANT: CPT | Performed by: EMERGENCY MEDICINE

## 2019-01-01 PROCEDURE — 99223 1ST HOSP IP/OBS HIGH 75: CPT | Performed by: INTERNAL MEDICINE

## 2019-01-01 PROCEDURE — 99233 SBSQ HOSP IP/OBS HIGH 50: CPT | Performed by: NURSE PRACTITIONER

## 2019-01-01 PROCEDURE — 36430 TRANSFUSION BLD/BLD COMPNT: CPT

## 2019-01-01 PROCEDURE — 70450 CT HEAD/BRAIN W/O DYE: CPT | Performed by: EMERGENCY MEDICINE

## 2019-01-01 PROCEDURE — 87040 BLOOD CULTURE FOR BACTERIA: CPT | Performed by: EMERGENCY MEDICINE

## 2019-01-01 PROCEDURE — 94664 DEMO&/EVAL PT USE INHALER: CPT

## 2019-01-01 PROCEDURE — 93010 ELECTROCARDIOGRAM REPORT: CPT | Performed by: EMERGENCY MEDICINE

## 2019-01-01 PROCEDURE — 83735 ASSAY OF MAGNESIUM: CPT | Performed by: EMERGENCY MEDICINE

## 2019-01-01 PROCEDURE — 30233N1 TRANSFUSION OF NONAUTOLOGOUS RED BLOOD CELLS INTO PERIPHERAL VEIN, PERCUTANEOUS APPROACH: ICD-10-PCS | Performed by: HOSPITALIST

## 2019-01-01 PROCEDURE — 93880 EXTRACRANIAL BILAT STUDY: CPT | Performed by: INTERNAL MEDICINE

## 2019-01-01 PROCEDURE — 86920 COMPATIBILITY TEST SPIN: CPT

## 2019-01-01 PROCEDURE — 3077F SYST BP >= 140 MM HG: CPT | Performed by: INTERNAL MEDICINE

## 2019-01-01 PROCEDURE — 86900 BLOOD TYPING SEROLOGIC ABO: CPT | Performed by: EMERGENCY MEDICINE

## 2019-01-01 PROCEDURE — 82962 GLUCOSE BLOOD TEST: CPT

## 2019-01-01 PROCEDURE — 3077F SYST BP >= 140 MM HG: CPT | Performed by: NEUROLOGICAL SURGERY

## 2019-01-01 PROCEDURE — 93306 TTE W/DOPPLER COMPLETE: CPT | Performed by: INTERNAL MEDICINE

## 2019-01-01 PROCEDURE — 83605 ASSAY OF LACTIC ACID: CPT | Performed by: EMERGENCY MEDICINE

## 2019-01-01 PROCEDURE — 87077 CULTURE AEROBIC IDENTIFY: CPT | Performed by: EMERGENCY MEDICINE

## 2019-01-01 PROCEDURE — 80053 COMPREHEN METABOLIC PANEL: CPT | Performed by: EMERGENCY MEDICINE

## 2019-01-01 PROCEDURE — 96360 HYDRATION IV INFUSION INIT: CPT

## 2019-01-01 RX ORDER — HYDROCODONE BITARTRATE AND ACETAMINOPHEN 7.5; 325 MG/1; MG/1
1 TABLET ORAL EVERY 6 HOURS PRN
COMMUNITY
End: 2019-01-01

## 2019-01-01 RX ORDER — SODIUM CHLORIDE 0.9 % (FLUSH) 0.9 %
10 SYRINGE (ML) INJECTION AS NEEDED
Status: DISCONTINUED | OUTPATIENT
Start: 2019-01-01 | End: 2019-12-07

## 2019-01-01 RX ORDER — OMEPRAZOLE 20 MG/1
20 CAPSULE, DELAYED RELEASE ORAL
Status: ON HOLD | COMMUNITY
End: 2019-12-07

## 2019-01-01 RX ORDER — DEXAMETHASONE 0.5 MG/1
2 TABLET ORAL
Status: ON HOLD | COMMUNITY
End: 2019-12-07

## 2019-01-01 RX ORDER — ACETAMINOPHEN 650 MG/1
650 SUPPOSITORY RECTAL EVERY 6 HOURS SCHEDULED
Status: DISCONTINUED | OUTPATIENT
Start: 2019-01-01 | End: 2019-01-01

## 2019-01-01 RX ORDER — DEXAMETHASONE SODIUM PHOSPHATE 10 MG/ML
2 INJECTION, SOLUTION INTRAMUSCULAR; INTRAVENOUS ONCE
Status: COMPLETED | OUTPATIENT
Start: 2019-01-01 | End: 2019-01-01

## 2019-01-01 RX ORDER — MORPHINE SULFATE 4 MG/ML
INJECTION, SOLUTION INTRAMUSCULAR; INTRAVENOUS
Status: COMPLETED
Start: 2019-01-01 | End: 2019-01-01

## 2019-01-01 RX ORDER — SODIUM CHLORIDE 9 MG/ML
INJECTION, SOLUTION INTRAVENOUS CONTINUOUS
Status: DISCONTINUED | OUTPATIENT
Start: 2019-01-01 | End: 2019-12-07

## 2019-01-01 RX ORDER — FOLIC ACID 1 MG/1
TABLET ORAL DAILY
Status: ON HOLD | COMMUNITY
End: 2019-12-07

## 2019-01-01 RX ORDER — HALOPERIDOL 5 MG/ML
2 INJECTION INTRAMUSCULAR
Status: DISCONTINUED | OUTPATIENT
Start: 2019-01-01 | End: 2019-12-07

## 2019-01-01 RX ORDER — ALBUTEROL SULFATE 2.5 MG/3ML
2.5 SOLUTION RESPIRATORY (INHALATION) EVERY 6 HOURS PRN
COMMUNITY

## 2019-01-01 RX ORDER — SODIUM CHLORIDE 9 MG/ML
INJECTION, SOLUTION INTRAVENOUS CONTINUOUS
Status: DISCONTINUED | OUTPATIENT
Start: 2019-01-01 | End: 2019-01-01

## 2019-01-01 RX ORDER — ONDANSETRON 4 MG/1
8 TABLET, ORALLY DISINTEGRATING ORAL EVERY 8 HOURS PRN
Status: DISCONTINUED | OUTPATIENT
Start: 2019-01-01 | End: 2019-01-01

## 2019-01-01 RX ORDER — ALBUTEROL SULFATE 90 UG/1
AEROSOL, METERED RESPIRATORY (INHALATION) EVERY 6 HOURS PRN
COMMUNITY
End: 2019-01-01

## 2019-01-01 RX ORDER — MAGNESIUM SULFATE HEPTAHYDRATE 40 MG/ML
2 INJECTION, SOLUTION INTRAVENOUS ONCE
Status: COMPLETED | OUTPATIENT
Start: 2019-01-01 | End: 2019-01-01

## 2019-01-01 RX ORDER — DEXAMETHASONE 4 MG/1
4 TABLET ORAL 2 TIMES DAILY WITH MEALS
COMMUNITY

## 2019-01-01 RX ORDER — METOPROLOL SUCCINATE 50 MG/1
50 TABLET, EXTENDED RELEASE ORAL DAILY
COMMUNITY

## 2019-01-01 RX ORDER — CALCITRIOL 0.25 UG/1
0.25 CAPSULE, LIQUID FILLED ORAL 2 TIMES DAILY
COMMUNITY

## 2019-01-01 RX ORDER — ALBUTEROL SULFATE 2.5 MG/3ML
2.5 SOLUTION RESPIRATORY (INHALATION) EVERY 6 HOURS PRN
Status: ON HOLD | COMMUNITY
End: 2019-12-07

## 2019-01-01 RX ORDER — AMLODIPINE BESYLATE 10 MG/1
TABLET ORAL
COMMUNITY
Start: 2018-02-07 | End: 2019-01-01

## 2019-01-01 RX ORDER — LORAZEPAM 2 MG/ML
0.5 INJECTION INTRAMUSCULAR ONCE
Status: COMPLETED | OUTPATIENT
Start: 2019-01-01 | End: 2019-01-01

## 2019-01-01 RX ORDER — METOPROLOL SUCCINATE 50 MG/1
50 TABLET, EXTENDED RELEASE ORAL
Qty: 30 TABLET | Refills: 2 | Status: ON HOLD | OUTPATIENT
Start: 2019-01-01 | End: 2019-12-07

## 2019-01-01 RX ORDER — EZETIMIBE 10 MG/1
TABLET ORAL
COMMUNITY
Start: 2018-11-06 | End: 2019-01-01 | Stop reason: SDUPTHER

## 2019-01-01 RX ORDER — EZETIMIBE 10 MG/1
TABLET ORAL
Qty: 90 TABLET | Refills: 0 | Status: SHIPPED | OUTPATIENT
Start: 2019-01-01 | End: 2019-01-01 | Stop reason: SDUPTHER

## 2019-01-01 RX ORDER — ACETAMINOPHEN 160 MG/5ML
650 SOLUTION ORAL EVERY 6 HOURS SCHEDULED
Status: DISCONTINUED | OUTPATIENT
Start: 2019-01-01 | End: 2019-01-01

## 2019-01-01 RX ORDER — ATORVASTATIN CALCIUM 80 MG/1
80 TABLET, FILM COATED ORAL NIGHTLY
Status: DISCONTINUED | OUTPATIENT
Start: 2019-01-01 | End: 2019-01-01

## 2019-01-01 RX ORDER — ALPRAZOLAM 0.25 MG/1
0.25 TABLET ORAL NIGHTLY PRN
Status: ON HOLD | COMMUNITY
End: 2019-12-07

## 2019-01-01 RX ORDER — ALBUTEROL SULFATE 2.5 MG/3ML
2.5 SOLUTION RESPIRATORY (INHALATION) EVERY 6 HOURS PRN
Status: DISCONTINUED | OUTPATIENT
Start: 2019-01-01 | End: 2019-01-01

## 2019-01-01 RX ORDER — VANCOMYCIN 2 GRAM/500 ML IN 0.9 % SODIUM CHLORIDE INTRAVENOUS
25 ONCE
Status: DISCONTINUED | OUTPATIENT
Start: 2019-01-01 | End: 2019-12-07

## 2019-01-01 RX ORDER — MIRTAZAPINE 15 MG/1
15 TABLET, FILM COATED ORAL NIGHTLY
COMMUNITY

## 2019-01-01 RX ORDER — MIRTAZAPINE 15 MG/1
15 TABLET, FILM COATED ORAL NIGHTLY
Status: ON HOLD | COMMUNITY
End: 2019-12-07

## 2019-01-01 RX ORDER — METOPROLOL SUCCINATE 50 MG/1
50 TABLET, EXTENDED RELEASE ORAL
Status: DISCONTINUED | OUTPATIENT
Start: 2019-01-01 | End: 2019-01-01

## 2019-01-01 RX ORDER — LISINOPRIL 10 MG/1
TABLET ORAL
COMMUNITY
Start: 2018-11-06 | End: 2019-01-01

## 2019-01-01 RX ORDER — ONDANSETRON HYDROCHLORIDE 8 MG/1
8 TABLET, FILM COATED ORAL EVERY 8 HOURS PRN
COMMUNITY

## 2019-01-01 RX ORDER — ATORVASTATIN CALCIUM 80 MG/1
TABLET, FILM COATED ORAL
COMMUNITY
Start: 2018-11-06

## 2019-01-01 RX ORDER — CALCITRIOL 0.25 UG/1
1 CAPSULE, LIQUID FILLED ORAL 2 TIMES DAILY
Status: DISCONTINUED | OUTPATIENT
Start: 2019-01-01 | End: 2019-01-01

## 2019-01-01 RX ORDER — ALBUTEROL SULFATE 2.5 MG/3ML
2.5 SOLUTION RESPIRATORY (INHALATION) 3 TIMES DAILY
Status: DISCONTINUED | OUTPATIENT
Start: 2019-01-01 | End: 2019-01-01

## 2019-01-01 RX ORDER — POTASSIUM CHLORIDE 20 MEQ/1
40 TABLET, EXTENDED RELEASE ORAL EVERY 4 HOURS
Status: COMPLETED | OUTPATIENT
Start: 2019-01-01 | End: 2019-01-01

## 2019-01-01 RX ORDER — SCOLOPAMINE TRANSDERMAL SYSTEM 1 MG/1
1 PATCH, EXTENDED RELEASE TRANSDERMAL
Status: DISCONTINUED | OUTPATIENT
Start: 2019-01-01 | End: 2019-12-07

## 2019-01-01 RX ORDER — HALOPERIDOL 5 MG/ML
1 INJECTION INTRAMUSCULAR
Status: DISCONTINUED | OUTPATIENT
Start: 2019-01-01 | End: 2019-12-07

## 2019-01-01 RX ORDER — FOLIC ACID 1 MG/1
1 TABLET ORAL DAILY
COMMUNITY

## 2019-01-01 RX ORDER — OMEPRAZOLE 20 MG/1
20 CAPSULE, DELAYED RELEASE ORAL DAILY
COMMUNITY

## 2019-01-01 RX ORDER — PANTOPRAZOLE SODIUM 20 MG/1
20 TABLET, DELAYED RELEASE ORAL
Status: DISCONTINUED | OUTPATIENT
Start: 2019-01-01 | End: 2019-01-01

## 2019-01-01 RX ORDER — DEXAMETHASONE SODIUM PHOSPHATE 4 MG/ML
VIAL (ML) INJECTION
Status: COMPLETED
Start: 2019-01-01 | End: 2019-01-01

## 2019-01-01 RX ORDER — EZETIMIBE 10 MG/1
10 TABLET ORAL NIGHTLY
Status: DISCONTINUED | OUTPATIENT
Start: 2019-01-01 | End: 2019-01-01

## 2019-01-01 RX ORDER — MORPHINE SULFATE 4 MG/ML
2 INJECTION, SOLUTION INTRAMUSCULAR; INTRAVENOUS EVERY 2 HOUR PRN
Status: DISCONTINUED | OUTPATIENT
Start: 2019-01-01 | End: 2019-12-07

## 2019-01-01 RX ORDER — ONDANSETRON 8 MG/1
8 TABLET, ORALLY DISINTEGRATING ORAL EVERY 8 HOURS PRN
Status: ON HOLD | COMMUNITY
End: 2019-12-07

## 2019-01-01 RX ORDER — FOLIC ACID 1 MG/1
1 TABLET ORAL DAILY
Status: DISCONTINUED | OUTPATIENT
Start: 2019-01-01 | End: 2019-01-01

## 2019-01-01 RX ORDER — METOPROLOL SUCCINATE 100 MG/1
TABLET, EXTENDED RELEASE ORAL
COMMUNITY
Start: 2018-11-13 | End: 2019-01-01

## 2019-01-01 RX ORDER — SODIUM CHLORIDE 9 MG/ML
INJECTION, SOLUTION INTRAVENOUS ONCE
Status: COMPLETED | OUTPATIENT
Start: 2019-01-01 | End: 2019-01-01

## 2019-01-01 RX ORDER — ACETAMINOPHEN 650 MG/1
650 SUPPOSITORY RECTAL EVERY 6 HOURS PRN
Status: DISCONTINUED | OUTPATIENT
Start: 2019-01-01 | End: 2019-12-07

## 2019-01-01 RX ORDER — FUROSEMIDE 10 MG/ML
20 INJECTION INTRAMUSCULAR; INTRAVENOUS EVERY 6 HOURS PRN
Status: DISCONTINUED | OUTPATIENT
Start: 2019-01-01 | End: 2019-12-07

## 2019-01-01 RX ORDER — ALPRAZOLAM 0.25 MG/1
0.25 TABLET ORAL NIGHTLY PRN
COMMUNITY

## 2019-01-01 RX ORDER — MORPHINE SULFATE IN 0.9 % NACL 1 MG/ML
1 PLASTIC BAG, INJECTION (ML) INTRAVENOUS CONTINUOUS
Status: DISCONTINUED | OUTPATIENT
Start: 2019-01-01 | End: 2019-12-07

## 2019-01-01 RX ORDER — ATROPINE SULFATE 10 MG/ML
2 SOLUTION/ DROPS OPHTHALMIC EVERY 2 HOUR PRN
Status: DISCONTINUED | OUTPATIENT
Start: 2019-01-01 | End: 2019-12-07

## 2019-01-01 RX ORDER — ALPRAZOLAM 0.25 MG/1
0.25 TABLET ORAL NIGHTLY PRN
Status: DISCONTINUED | OUTPATIENT
Start: 2019-01-01 | End: 2019-01-01

## 2019-01-01 RX ORDER — MIRTAZAPINE 15 MG/1
15 TABLET, FILM COATED ORAL NIGHTLY
Status: DISCONTINUED | OUTPATIENT
Start: 2019-01-01 | End: 2019-01-01

## 2019-01-01 RX ORDER — SCOLOPAMINE TRANSDERMAL SYSTEM 1 MG/1
1 PATCH, EXTENDED RELEASE TRANSDERMAL
Status: DISCONTINUED | OUTPATIENT
Start: 2019-01-01 | End: 2019-01-01

## 2019-01-01 RX ORDER — LORAZEPAM 2 MG/ML
INJECTION INTRAMUSCULAR
Status: COMPLETED
Start: 2019-01-01 | End: 2019-01-01

## 2019-01-01 RX ORDER — ACETAMINOPHEN 160 MG/5ML
650 SOLUTION ORAL EVERY 6 HOURS PRN
Status: DISCONTINUED | OUTPATIENT
Start: 2019-01-01 | End: 2019-12-07

## 2019-01-01 RX ORDER — FUROSEMIDE 10 MG/ML
INJECTION INTRAMUSCULAR; INTRAVENOUS
Status: COMPLETED
Start: 2019-01-01 | End: 2019-01-01

## 2019-01-01 RX ORDER — CLOPIDOGREL BISULFATE 75 MG/1
TABLET ORAL
COMMUNITY
Start: 2018-04-05 | End: 2019-01-01

## 2019-01-01 RX ORDER — HYDROCHLOROTHIAZIDE 12.5 MG/1
12.5 CAPSULE, GELATIN COATED ORAL DAILY
Status: ON HOLD | COMMUNITY
End: 2019-01-01

## 2019-01-01 RX ORDER — MORPHINE SULFATE 4 MG/ML
4 INJECTION, SOLUTION INTRAMUSCULAR; INTRAVENOUS EVERY 2 HOUR PRN
Status: DISCONTINUED | OUTPATIENT
Start: 2019-01-01 | End: 2019-12-07

## 2019-01-01 RX ORDER — EZETIMIBE 10 MG/1
TABLET ORAL
Qty: 90 TABLET | Refills: 1 | Status: SHIPPED | OUTPATIENT
Start: 2019-01-01

## 2019-01-01 RX ORDER — LORAZEPAM 2 MG/ML
1 CONCENTRATE ORAL EVERY 4 HOURS PRN
Status: DISCONTINUED | OUTPATIENT
Start: 2019-01-01 | End: 2019-12-07

## 2019-01-01 RX ORDER — CALCITRIOL 0.25 UG/1
1 CAPSULE, LIQUID FILLED ORAL 2 TIMES DAILY
Status: ON HOLD | COMMUNITY
End: 2019-12-07

## 2019-01-01 ASSESSMENT — PATIENT HEALTH QUESTIONNAIRE - PHQ9
2. FEELING DOWN, DEPRESSED OR HOPELESS: NOT AT ALL
1. LITTLE INTEREST OR PLEASURE IN DOING THINGS: NOT AT ALL
SUM OF ALL RESPONSES TO PHQ9 QUESTIONS 1 AND 2: 0
SUM OF ALL RESPONSES TO PHQ9 QUESTIONS 1 AND 2: 0

## 2019-02-04 PROBLEM — D69.6 THROMBOCYTOPENIA (HCC): Status: ACTIVE | Noted: 2019-01-01

## 2019-02-04 PROBLEM — D64.9 ANEMIA, UNSPECIFIED TYPE: Status: ACTIVE | Noted: 2019-01-01

## 2019-02-04 PROBLEM — R55 SYNCOPE: Status: ACTIVE | Noted: 2019-01-01

## 2019-02-04 PROBLEM — R55 SYNCOPE, UNSPECIFIED SYNCOPE TYPE: Status: ACTIVE | Noted: 2019-01-01

## 2019-02-04 NOTE — ED NOTES
Spoke to [facility] in hematology. Lab is doing a manual diff and will release results shortly. Dr. Hale Erp notifed.

## 2019-02-04 NOTE — H&P
[facility] Hospitalist H&P       CC: Patient presents with:  Syncope (cardiovascular, neurologic)       PCP: Jonny Aguirre MD    History of Present Illness:  Pt is a 68year old male with PMHx metastatic SCC of the lung on chemotherapy who presented after an hydrochlorothiazide 12.5 MG Oral Cap Take 12.5 mg by mouth daily. Disp:  Rfl:    ondansetron 8 MG Oral Tablet Dispersible Take 8 mg by mouth every 8 (eight) hours as needed for Nausea.  Disp:  Rfl:    ALPRAZolam 0.25 MG Oral Tab Take 0.25 mg by mouth nissa BS+  MSK: moving all extremities, no LE edema  Neuro: CN II-XII intact, no focal deficits, strength intact  Skin: no rashes/lesions. +pallor.    Psych: cooperative, normal mood/affect          Diagnostic Data:    CBC/Chem  Recent Labs   Lab  02/04/19   1142 significant positive mass effect or midline shift. There is nothing specific for an acute territorial infarct. Clinical correlation is recommended. There is no acute intracranial hemorrhage, mass effect or midline shift.   Atherosclerotic vascular calcif clinically indicated.      Dictated by: Zahra Adams MD on 2/04/2019 at 12:11     Approved by: Zahra Adams MD                   ASSESSMENT / PLAN:     68year old male with PMHx metastatic SCC of the lung on chemotherapy who presented after an episode of synco

## 2019-02-04 NOTE — PLAN OF CARE
NURSING ADMISSION NOTE    4071  Patient admitted to room 431 from ER. Accompanied by ER nurse as receiving transfusion of PRBCs  Oriented to room. Safety precautions initiated. Bed in low position. Call light in reach. VSS.  Admission navigator com

## 2019-02-04 NOTE — ED INITIAL ASSESSMENT (HPI)
Pt was at his PMD's office for a general check up, states was sitting in the [facility] when he started to feel dizzy, pt had a syncopal episode. EMS reports systolic BP in the 89'Z. Pt denies c/o at this time.

## 2019-02-04 NOTE — ED PROVIDER NOTES
Patient Seen in: [facility] Emergency Department    History   Patient presents with:  Syncope (cardiovascular, neurologic)    Stated Complaint: syncope    HPI    72-year-old male comes to the hospital complaint of having had a syncopal episode while O2 Device None (Room air)       Current:BP 91/66   Pulse 85   Temp 96.9 °F (36.1 °C) (Pulmonary Artery)   Resp 16   Ht 185.4 cm (6' 1\")   Wt 127.5 kg   SpO2 95%   BMI 37.07 kg/m²         Physical Exam    HEENT : NCAT, EOMI, PEERL, throat clear, neck sup ---------                               -----------         ------                     CBC W/ DIFFERENTIAL[[id number]]          Abnormal            Final result                 Please view results for these tests on the individual orders. MD Naheed            Medications   sodium chloride 0.9% IV bolus 1,000 mL (0 mL Intravenous Stopped 2/4/19 9227)     The patient had significant anemia while here and had blood products ordered.   He was seen by his oncologist in the department and I spoke wi

## 2019-02-04 NOTE — ED NOTES
FL [facility] PROCEDURE (12/21/2018 3:30 PM CST)  Only the most recent of 4 results within the time period is included.    FL ADVOCATE PROCEDURE (12/21/2018 3:30 PM CST)   Impressions Performed At   Technically successful placement of 8 [place] purple power P

## 2019-02-05 PROBLEM — D64.9 SEVERE ANEMIA: Status: ACTIVE | Noted: 2019-01-01

## 2019-02-05 PROBLEM — C34.90 SMALL CELL LUNG CANCER IN ADULT (HCC): Status: ACTIVE | Noted: 2019-01-01

## 2019-02-05 PROBLEM — Z95.828 H/O ENDOVASCULAR STENT GRAFT FOR ABDOMINAL AORTIC ANEURYSM: Chronic | Status: ACTIVE | Noted: 2019-01-01

## 2019-02-05 PROBLEM — D61.810 PANCYTOPENIA DUE TO ANTINEOPLASTIC CHEMOTHERAPY (HCC): Status: ACTIVE | Noted: 2019-01-01

## 2019-02-05 PROBLEM — T45.1X5A PANCYTOPENIA DUE TO ANTINEOPLASTIC CHEMOTHERAPY (HCC): Status: ACTIVE | Noted: 2019-01-01

## 2019-02-05 PROBLEM — I95.1 SYNCOPE DUE TO ORTHOSTATIC HYPOTENSION: Status: ACTIVE | Noted: 2019-01-01

## 2019-02-05 NOTE — CONSULTS
Consulting Physician: Dr. Paz Hood    CC:  Syncope    HPI:  This is a 68year old male with advanced small cell lung cancer presenting for evaluation of syncope. Apparently, the patient was at his oncologist's office 2 days ago.   He remembers feeling 'bryce Smoker        Packs/day: 2.00        Quit date:         Years since quittin.1      Smokeless tobacco: Never Used    Substance and Sexual Activity      Alcohol use:  Yes        Alcohol/week: 0.6 oz        Types: 1 Glasses of wine per week        Com

## 2019-02-05 NOTE — CONSULTS
[facility]  Report of Consultation    Toby Alvarado Patient Status:  Inpatient    1945 MRN [id number]   [facility] 4NW-A Attending Rivka Riggins MD   Hosp Day # 1 PCP Romero Thompson MD     Reason for Consultation: reviewed. No pertinent family history. No family history of CAD, AAA or syncope. reports that he quit smoking about 14 years ago. He smoked 2.00 packs per day.  he has never used smokeless tobacco. He reports that he drinks about 0.6 oz of alcohol per week bruits. Cardiac: Regular rhythm, S1, S2 normal, no murmur, rub or gallop. Lungs: Clear without wheezes, rales, rhonchi or dullness. Abdomen: Soft, non-tender. Extremities: Without clubbing, cyanosis or edema. Peripheral pulses are 2+.   Neurologic: A

## 2019-02-05 NOTE — HISTORICAL OFFICE NOTE
Howie Yeh  : 1945  ACCOUNT:  [id number]  [phone]  PCP: Dr. Teodora Monsalve: 10/03/2018  DICTATED BY:  [Dr. Margarita Resendiz    This note was dictated via dragon and therefore the may be unrecognized errors    GRAHAM limiting arthritis. NEURO: no slurred speech, seizures, or localized weakness and past hx of  CVA  no residuals. ENDO: DM   stable. HEM/LYMPH: easy bruising. ALL: no new allergies.       PAST HISTORY: transient ischemic attack (TIA), degenerative joint dise normal S1, S2 without S3; faint systolic murmur. CAROTIDS: carotid pulses normal. ABD AORTA: difficult to assess abdominal aorta. FEM: right groin steristrips intact small amt sangenious d/c w/ mild erthyma lateral edge w/out purulent d/c.  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  8. EF 67%, nuclear 8/06; EF 62% nuclear 3/2011; EF 62%, nuclear 2011. 9. Chronic renal insufficiency. 10. Documented sleep apnea. 11. High sensitivity CRP at 7.96-09/10.  12. Obesity.         PLAN:  [0  To start exercis

## 2019-02-05 NOTE — CONSULTS
[facility]  Report of Consultation    Sanchez Carreon Patient Status:  Inpatient    1945 MRN [id number]   [facility] 4NW-A Attending Bren Hopper MD   Hosp Day # 1 PCP Mando Bolivar MD     Reason for Consultation:  Ext Past Surgical History:   Procedure Laterality Date   • APPENDECTOMY     • CATH ABDOMINAL AORTIC ANEURYSM     • HERNIA SURGERY     • TONSILLECTOMY       History reviewed. No pertinent family history. reports that he quit smoking about 14 years ago.  He Resp 18   Ht 6' 1\" (1.854 m)   Wt 283 lb 8 oz (128.6 kg)   SpO2 96%   BMI 37.40 kg/m²      General no acute distress, well developed male   HEENT moist oral mucosa, uses dentures   Neck symmetric, no palpable mass, no thyromegaly   Lungs: not labored, c There is mild diffuse prominence of the frontal and temporal sulci consistent with atrophy. INTRACRANIAL:  There is decreased attenuation in the left frontal lobe and left temporal lobe consistent with an old infarct.   There is no significant positive mas pneumothorax.     =====  CONCLUSION:    1. Findings most consistent with scattered atelectasis and or scar without definite focal consolidative process appreciated. PA and lateral projection for further evaluation as clinically indicated.               D

## 2019-02-05 NOTE — HISTORICAL OFFICE NOTE
Howie Yeh  [phone]  : 1945  ACCOUNT:  [id number]  PCP: Teodora Mcdaniel M.D. Elaine Pruett M.D.   CARDIOLOGIST: Dr. Katlyn Conti: Eboni Vera  Admitted: 2019  Discharged:  2019    DISCHARGE DIAGNOSIS  pSVT, frequent atrial ec

## 2019-02-05 NOTE — CDS QUERY
Clinical Significance – Hematology Results  Obdulia Lopez  Dear Doctor:  Clinical information (provided below) includes documentation of hematology results that are less than the normal range.  For accurate ICD-10-CM code assignm

## 2019-02-05 NOTE — PROCEDURES
Clinical History: 68year old male with syncope    EEG DESCRIPTION    AWAKE  Background: 5-6 Hz; 30-70 uV; posterior head regions, symmetric, waxing and waning, reactive to eye opening and closure  Beta: 15-25 Hz; 20 uV; frontocentral, symmetric, waxing an

## 2019-02-05 NOTE — PAYOR COMM NOTE
--------------  ADMISSION REVIEW         2/4      ED       Patient presents with:  Syncope      Stated Complaint: syncope     HPI     12-year-old male comes to the hospital complaint of having had a syncopal episode while lying by his physician's office. 1.39 (*)       Calcium, Total 6.8 (*)       GFR, Non- 50 (*)       GFR, -American 58 (*)       Alt 14 (*)       Alkaline Phosphatase 801 (*)       Bilirubin, Total 2.3 (*)       Total Protein 6.2 (*)       Albumin 2.9 (*)       A/G R

## 2019-02-05 NOTE — HOME CARE LIAISON
Referral received from [address] [place] [address] to offer home health. I met with patient and he reports that he is current with another [facility] agency through Jesus Manuel Zhu updated on this.   Thank you for the referral.

## 2019-02-05 NOTE — PROGRESS NOTES
[facility] Hospitalist Progress Note     Zafar Ness Patient Status:  Inpatient    1945 MRN [id number]   [facility] 4NW-A Attending Nellie Newman MD   Hosp Day # 1 PCP Erin Okeefe MD     CC: follow up    SUBJECTIVE:  D/w pa 2/4/2019  CONCLUSION:  1. Findings are most consistent with an old infarct in the left frontal and temporal lobes. Correlate clinically. If there is any clinical suspicion for an acute territorial infarct then followup MRI is recommended.  2.  Intracrani loss  - s/p 2U pRBC  - monitor HgB     # SCC of the lung, metastatic  - consult onc while inpatient     # GERD  - PPI     # HLD  - statin, ezetimibe        Regular diet  SCDs  Full code  Agosto, remain inpatient for further syncope w/u     D/w patient.     O

## 2019-02-05 NOTE — HISTORICAL OFFICE NOTE
[address]  [address]    PATIENT NAME: Johnnie Hamlin OF BIRTH: 07/22/1945  MRN: [id number]  ATTENDING PHYSICIAN:   ROOM: Psychiatric  DICTATING PHYSICIAN: De Espinoza M.D.  UNIT#/ACCOUNT#: [id number]  HEATHER respiratory rate is 18 to 20. He is saturating at 99%. NECK: No neck vein distention, carotid bruits, or thyroid enlargement. HEENT: Mouth is moist. Sclerae anicteric. CHEST: No wheezing. He had some rhonchi.    CARDIAC: There are no murmurs, gallops, o

## 2019-02-05 NOTE — PLAN OF CARE
CARDIOVASCULAR - ADULT    • Maintains optimal cardiac output and hemodynamic stability Not Progressing    • Absence of cardiac arrhythmias or at baseline Not Progressing        Pt. Placed on monitor. HR In the 70s to 80s. Denies CP.  Orthostatic VS complete

## 2019-02-05 NOTE — PLAN OF CARE
HEMATOLOGIC - ADULT    • Maintains hematologic stability Progressing        PAIN - ADULT    • Verbalizes/displays adequate comfort level or patient's stated pain goal Progressing          Pt AOx4. Pt denies having any pain. Pt took evening meds per [facility]  Pt

## 2019-02-05 NOTE — HOME CARE LIAISON
I received a call from patient's spouse Alin Becker and she confirmed that patient is in fact current with [address] [place] [address] for RN PT OT and they would like to resume with [facility] at discharge. Her number is [phone].   I left a voice mail t

## 2019-02-06 NOTE — PROGRESS NOTES
[facility] Hospitalist Progress Note     Renetta Masterson Patient Status:  Inpatient    1945 MRN [id number]   [facility] 4NW-A Attending Keven Campos MD   Hosp Day # 2 PCP Vidal Sanchez MD     CC: follow up    SUBJECTIVE:  Abel Guerra 118 ov epoetin gordo  40,000 Units Subcutaneous Once   • metoprolol succinate  50 mg Oral Daily Beta Blocker   • atorvastatin  80 mg Oral Nightly   • calciTRIOL  1 mcg Oral BID   • ezetimibe  10 mg Oral Nightly   • folic acid  1 mg Oral Daily   • mirtazapine  15 m

## 2019-02-06 NOTE — PLAN OF CARE
CARDIOVASCULAR - ADULT    • Maintains optimal cardiac output and hemodynamic stability Progressing    • Absence of cardiac arrhythmias or at baseline Progressing      Pt HR in 80's and /74.       HEMATOLOGIC - ADULT    • Maintains hematologic stabilit

## 2019-02-06 NOTE — PROGRESS NOTES
[facility]  Progress Note    Sandy Charlton Patient Status:  Inpatient    1945 MRN [id number]   [facility] 4NW-A Attending Shana Lesches, MD   Hosp Day # 2 PCP Zeny Burton MD       Assessment:    · Syncope  · HTN  · H 1 mcg Oral BID   • ezetimibe  10 mg Oral Nightly   • folic acid  1 mg Oral Daily   • mirtazapine  15 mg Oral Nightly   • Pantoprazole Sodium  [facility]     • sodium chloride 75 mL/hr at 02/05/19 2056         Zuleyka Starks MD  2/6/2019  10:05 AM

## 2019-02-06 NOTE — PROGRESS NOTES
Patient has been up ambulating in room with his walker gait steady. Denies any c/o at this time had received one unit of blood and repeated hgb was above 8. Denies any c/o pain or discomfort at this time.

## 2019-02-06 NOTE — CM/SW NOTE
02/06/19 1400   CM/SW Referral Data   Referral Source Social Work (self-referral)   Reason for Referral Discharge planning   Informant Patient   Patient Info   Patient's Mental Status Alert   Patient's [address]   Patient lives with Spouse

## 2019-02-06 NOTE — PROGRESS NOTES
[facility]  Progress Note    Sanchez Carreon Patient Status:  Inpatient    1945 MRN [id number]   [facility] 4NW-A Attending Bren Hopper MD   Hosp Day # 2 PCP Mando Bolivar MD     Subjective:   Sanchez Carreon is a(n) Regular.      VELOCITY MEASUREMENTS:              Right CCA Peak Systolic Velocity:  95.83 cm/s              Right Proximal ICA Peak Systolic Velocity:  06.18 cm/s              Right Mid ICA Peak Systolic Velocity:  28.53 cm/s              Right Distal ICA count improving   -Will plan for CBC on Friday as an outpatient & PRN transfusion      3.  Extensive stage small cell lung cancer      -S/P 2 cycles of carboplatin, etoposide and atezolizumab  -Last chemotherapy given on 1/23/19  -Dose of chemotherapy was r

## 2019-02-07 NOTE — PROGRESS NOTES
[facility]  Progress Note    Alton Smart Patient Status:  Inpatient    1945 MRN [id number]   [facility] 4NW-A Attending Nadiya Nolasco MD   Hosp Day # 3 PCP Margret Alfaro MD       Assessment:    · Syncope - orthostatics • Pantoprazole Sodium  20 mg Oral [facility]           Lidya Galindo MD  2/7/2019  7:03 AM

## 2019-02-07 NOTE — PROGRESS NOTES
Patient discharged to home explained all medications and follow up appt . Patient was given a copy of his labs as requested. Patient daughter here to take patient home. all belonging taken with patient.

## 2019-02-07 NOTE — PROGRESS NOTES
[facility]  Progress Note    Jairo Dolan Patient Status:  Inpatient    1945 MRN [id number]   [facility] 4NW-A Attending Zafar Means MD   Hosp Day # 3 PCP Sven Escobedo MD     Subjective:   Jairo Dolan is a(n) Velocity:  61.18 cm/s              Right Proximal ICA Peak Systolic Velocity:  13.59 cm/s              Right Mid ICA Peak Systolic Velocity:  70.26 cm/s              Right Distal ICA Peak Systolic Velocity:  73.18 cm/s              Right ICA/CCA Velocity R chemotherapy given on 1/23/19  -Dose of chemotherapy was reduced by 20 %  -Additional chemotherapy will be delayed further  -Disease is extensive & intent of treatment is palliative   -Limited code status is recommended   -[facility] is follo

## 2019-02-07 NOTE — DISCHARGE SUMMARY
General Medicine Discharge Summary     Patient ID:  Nya Caicedo  68year old  7/22/1945    Admit date: 2/4/2019    Discharge date and time: 2/7/19  Attending Physician: DO Chacorta Flynn ([address]) which includes the Dose Index Registry. PATIENT STATED HISTORY: (As transcribed by Technologist)  Syncopal episode at cancer center today. Denies head injury. old history of stroke.     FINDINGS:  VENTRICLES/SULCI:  There syncope, recurrent  TECHNIQUE:  Real-time gray-scale and duplex ultrasound was used to evaluate the bilateral extracranial carotid and vertebral arteries.   B-mode 2-dimensional images of the vascular structures, Doppler spectral analysis, and color flow Do transcribed by Technologist)  Patient had a syncopal episode at his doctors office today. FINDINGS:  Cardio mediastinal silhouette stable. Right CT compatible Port-A-Cath noted with tip at the SVC/RA junction.   Scattered interstitial opacities may be 02/07/19  0423   BP: 133/89   Pulse: 82   Resp: 18   Temp: 98.4 °F (36.9 °C)       Alert oriented.        Total time coordinating care for discharge: Greater than 30 minutes    Leonarda Peña DO  [facility]

## 2019-02-07 NOTE — CM/SW NOTE
02/07/19 1400   Discharge disposition   Expected discharge disposition Home or Self   Name of Facillity/Home [address]   Discharge transportation Private car   [facility] made aware the pt will dc today

## 2019-06-13 PROBLEM — I63.9 CVA (CEREBRAL VASCULAR ACCIDENT) (CMD): Status: ACTIVE | Noted: 2019-01-01

## 2019-06-13 PROBLEM — I77.9 CAROTID ARTERY DISEASE (CMD): Status: ACTIVE | Noted: 2019-01-01

## 2019-06-13 PROBLEM — E78.5 DYSLIPIDEMIA: Status: ACTIVE | Noted: 2019-01-01

## 2019-06-13 PROBLEM — I10 ESSENTIAL HYPERTENSION: Status: ACTIVE | Noted: 2019-01-01

## 2019-12-06 PROBLEM — C34.90 PRIMARY MALIGNANT NEOPLASM OF LUNG METASTATIC TO OTHER SITE, UNSPECIFIED LATERALITY (HCC): Status: ACTIVE | Noted: 2019-01-01

## 2019-12-06 PROBLEM — R41.0 DELIRIUM: Status: ACTIVE | Noted: 2019-01-01

## 2019-12-06 NOTE — ED NOTES
Per  sendron, no need for a straight cath urine at this time. Pt will provide sample when he is able.

## 2019-12-06 NOTE — PROGRESS NOTES
S: I was called to attend to patient and family. Patient surrounded by family members, including his wife Ana Mcknight and daughter Pascale Mai. O: Family demonstrates acceptance.    A: Offered emotional and spiritual support to both the patient and the family leia

## 2019-12-06 NOTE — ED NOTES
Dr batista at bedside. Lung sounds becoming wet and crackles heard. Pt is awake but not alert or verbal. Pt suctioned. Daughter and wife at bedside, code status discussed.  Nasal trumpet placed in left nostril

## 2019-12-06 NOTE — ED INITIAL ASSESSMENT (HPI)
Per wife, pt has hx of CA, awoke this morning weaker than usual and she was unable to get him out of bed to get him to the doctor . On the ride over, pt became less verbal, per medics.  Upon arrival pt is mumbling and making noises, appears to be in no dist

## 2019-12-06 NOTE — H&P
[facility]    H&P  [facility]INOLOGY [facility]    Ruby Lowery Patient Status:  Inpatient    1945 MRN [id number]   Location [facility] 4W/SW/SE Attending Marcia Casey MD   Hosp Day # 0 PCP Kane Salgado MD     Date o 0.9% NaCl 500 mL, 25 mg/kg, Intravenous, Once  •  levETIRAcetam (KEPPRA) 500 mg in sodium chloride 0.9% 100 mL IVPB, 500 mg, Intravenous, Once  •  furosemide (LASIX) injection 20 mg, 20 mg, Intravenous, Q6H PRN  •  LORazepam (INTENSOL) 2 MG/ML ORAL concent of face and neck    Labs, Imaging and Ancillaries:    Lab Results   Component Value Date    WBC 16.5 12/06/2019    HGB 11.0 12/06/2019    HCT 35.3 12/06/2019    .0 12/06/2019    CREATSERUM 1.53 12/06/2019    BUN 20 12/06/2019     12/06/2019 metastatic to other site, unspecified laterality ([facility] [address]  Acute mental status changes  Primary lung cancer with metastasis to brain S/P radiation whole brain several months ago  Type 2 DM    Patient has had a significant change in his clinical status since p

## 2019-12-06 NOTE — PROGRESS NOTES
S: Asked by family to provide continued support. Patient continues to be surrounded by family and friends. O: Patient continues to decline. Family demonstrates anxiousness as well as fatigue.     A: Offered emotional and spiritual support to family through

## 2019-12-06 NOTE — ED NOTES
DNR in place per family. Dr Ridley ordered no more fluids be administered at this time. Pt received a total of 1100 mL fluid, not planning to administer Keppra of Vanco. Monitor set to comfort care in room,  at bedside.  Pt will not be sent upstairs

## 2019-12-06 NOTE — ED PROVIDER NOTES
Patient Seen in: [facility] Emergency Department      History   Patient presents with:  Dehydration    Stated Complaint:     HPI    Patient is a 22-year-old male who presents to the emergency department with a chief complaint of altered mental sta Physical Exam  Vitals signs and nursing note reviewed. Constitutional:       Appearance: He is well-developed. He is ill-appearing. Comments: obtunded   HENT:      Head: Normocephalic and atraumatic.       Mouth/Throat:      Mouth: Mucous mem LACTIC ACID 3 HR POST POSITIVE - Normal   CBC WITH DIFFERENTIAL WITH PLATELET    Narrative: The following orders were created for panel order CBC WITH DIFFERENTIAL WITH PLATELET.   Procedure                               Abnormality         Status

## 2019-12-07 NOTE — PROGRESS NOTES
visited with grieving family.  Support and prayer for patient and family comfort was offered     12/07/19 8875   Clinical Encounter Type   Visited With Family   Crisis Visit Death   Patient's Supportive Strategies/Resources Family present at bedsid

## 2019-12-07 NOTE — SIGNIFICANT EVENT
Pt  at [address]. Resusitation not attempted as pt was DNR.     Time of Death     Family Notified Anaya Kolb (wife) at beside with other family members    MD Dr. Zaira Blankenship paged    [employer] [employer] Notified Andrew Steinberg reference #[id number]    Eye bank notified-Hussein

## 2019-12-10 NOTE — PAYOR COMM NOTE
--------------  ADMISSION REVIEW     PayorMagdaleno Slaughter MEDICARE ADV PPO  Subscriber #:  [id number]  Authorization Number: [id number]    Admit date: 12/6/19  Admit time: 0       Admitting Physician: Liliana Leahy MD  Attending Physician:  No att. provider round, and reactive to light. Neck:      Musculoskeletal: Neck supple. Cardiovascular:      Rate and Rhythm: Normal rate and regular rhythm. Heart sounds: Normal heart sounds.    Pulmonary:      Effort: Pulmonary effort is normal.      Breath sound [facility]    Disposition:  Admit  2019 12:03 pm    Present on Admission  Date Reviewed: 2019          ICD-10-CM Noted POA    Delirium R41.0 2019 Unknown         H&P    Arabella Smith Patient Status:  Inpatient    1945 MRN [id number]   L liquid 650 mg, 650 mg, Oral, 4 times per day **OR** acetaminophen (TYLENOL) 650 MG rectal suppository 650 mg, 650 mg, Rectal, 4 times per day  •  haloperidol lactate (HALDOL) 5 MG/ML injection 1 mg, 1 mg, Intravenous, Q1H PRN **OR** haloperidol lactate (HA cellulitis, subsequent encounter     Syncope     Syncope, unspecified syncope type     Severe anemia     Thrombocytopenia (HCC)     Syncope due to orthostatic hypotension     Small cell lung cancer in [facility])     Pancytopenia due to antineoplastic chem

## 2019-12-11 ENCOUNTER — APPOINTMENT (OUTPATIENT)
Dept: CARDIOLOGY | Age: 74
End: 2019-12-11

## 2019-12-11 NOTE — PAYOR COMM NOTE
--------------  DISCHARGE REVIEW    Payor: [employer] MEDICARE ADV PPO  Subscriber #:  [id number]  Authorization Number: [id number]    Admit date: 12/6/19  Admit time:  3880  Discharge Date: 12/6/2019  9:24 PM     Admitting Physician: Prakash Claire MD  [place]

## 2019-12-22 NOTE — DISCHARGE SUMMARY
[facility]    Discharge Summary    Tara Mancera Patient Status:  Inpatient    1945 MRN [id number]   Location [facility] 4W/SW/SE Attending No att. providers found   [facility] Day # 1 PCP Mega Bojorquez MD     Date of Admiss

## 2019-12-31 ENCOUNTER — PRIOR ORIGINAL RECORDS (OUTPATIENT)
Dept: OTHER | Age: 74
End: 2019-12-31

## 2020-02-09 VITALS
BODY MASS INDEX: 37.9 KG/M2 | HEART RATE: 62 BPM | RESPIRATION RATE: 20 BRPM | WEIGHT: 285.98 LBS | DIASTOLIC BLOOD PRESSURE: 82 MMHG | HEIGHT: 73 IN | SYSTOLIC BLOOD PRESSURE: 122 MMHG

## 2020-02-09 VITALS
SYSTOLIC BLOOD PRESSURE: 106 MMHG | DIASTOLIC BLOOD PRESSURE: 63 MMHG | RESPIRATION RATE: 20 BRPM | HEIGHT: 73 IN | HEART RATE: 57 BPM | WEIGHT: 293.1 LBS | BODY MASS INDEX: 38.85 KG/M2

## 2020-02-28 ENCOUNTER — APPOINTMENT (OUTPATIENT)
Dept: CARDIOLOGY | Age: 75
End: 2020-02-28

## 2020-04-01 ENCOUNTER — HOSPITAL (OUTPATIENT)
Dept: OTHER | Age: 75
End: 2020-04-01

## 2020-05-01 ENCOUNTER — HOSPITAL (OUTPATIENT)
Dept: OTHER | Age: 75
End: 2020-05-01

## 2020-06-01 ENCOUNTER — HOSPITAL (OUTPATIENT)
Dept: OTHER | Age: 75
End: 2020-06-01

## 2020-07-01 ENCOUNTER — HOSPITAL (OUTPATIENT)
Dept: OTHER | Age: 75
End: 2020-07-01
Attending: INTERNAL MEDICINE

## 2020-10-09 LAB
% SATURATION: 19 % (CALC) (ref 20–48)
% SATURATION: 31 % (CALC) (ref 20–48)
% SATURATION: 45 % (CALC) (ref 20–48)
ALBUMIN/GLOB SERPL: 1.1 (CALC) (ref 1–2.5)
ALBUMIN/GLOB SERPL: 1.2 (CALC)
ALBUMIN/GLOB SERPL: 1.3 (CALC) (ref 1–2.5)
ALBUMIN/GLOB SERPL: 1.4 (CALC) (ref 1–2.5)
ALBUMIN/GLOB SERPL: 1.5 (CALC) (ref 1–2.5)
ALBUMIN/GLOB SERPL: 1.5 (CALC) (ref 1–2.5)
ALBUMIN/GLOB SERPL: 1.6 (CALC) (ref 1–2.5)
ALBUMIN/GLOB SERPL: 1.7 (CALC) (ref 1–2.5)
ALBUMIN/GLOB SERPL: 1.7 (CALC) (ref 1–2.5)
ALBUMIN/GLOB SERPL: 1.8 (CALC) (ref 1–2.5)
ALBUMIN/GLOB SERPL: 1.9 (CALC) (ref 1–2.5)
ALBUMIN/GLOB SERPL: 2 (CALC) (ref 1–2.5)
ALBUMIN/GLOB SERPL: 2 (CALC) (ref 1–2.5)
ALBUMIN/GLOB SERPL: 2.1 (CALC) (ref 1–2.5)
ALBUMIN/GLOB SERPL: 2.2 (CALC) (ref 1–2.5)
ALBUMIN/GLOB SERPL: 2.3 (CALC) (ref 1–2.5)
ALBUMIN/GLOB SERPL: 2.3 (CALC) (ref 1–2.5)
ALBUMIN: 3.5 G/DL
ALBUMIN: 3.5 G/DL (ref 3.6–5.1)
ALBUMIN: 3.5 G/DL (ref 3.6–5.1)
ALBUMIN: 3.6 G/DL (ref 3.6–5.1)
ALBUMIN: 3.7 G/DL (ref 3.6–5.1)
ALBUMIN: 3.7 G/DL (ref 3.6–5.1)
ALBUMIN: 3.9 G/DL (ref 3.6–5.1)
ALBUMIN: 4 G/DL (ref 3.6–5.1)
ALBUMIN: 4.1 G/DL (ref 3.6–5.1)
ALBUMIN: 4.2 G/DL (ref 3.6–5.1)
ALBUMIN: 4.2 G/DL (ref 3.6–5.1)
ALBUMIN: 4.3 G/DL (ref 3.6–5.1)
ALKALINE PHOSPHATASE: 106 UNIT/L (ref 40–115)
ALKALINE PHOSPHATASE: 109 UNIT/L (ref 40–115)
ALKALINE PHOSPHATASE: 112 UNIT/L (ref 40–115)
ALKALINE PHOSPHATASE: 120 UNIT/L (ref 40–115)
ALKALINE PHOSPHATASE: 127 UNIT/L (ref 40–115)
ALKALINE PHOSPHATASE: 130 UNIT/L (ref 40–115)
ALKALINE PHOSPHATASE: 137 UNIT/L (ref 40–115)
ALKALINE PHOSPHATASE: 140 UNIT/L (ref 40–115)
ALKALINE PHOSPHATASE: 143 UNIT/L (ref 40–115)
ALKALINE PHOSPHATASE: 144 UNIT/L (ref 40–115)
ALKALINE PHOSPHATASE: 153 UNIT/L (ref 40–115)
ALKALINE PHOSPHATASE: 157 UNIT/L (ref 40–115)
ALKALINE PHOSPHATASE: 177 UNIT/L (ref 40–115)
ALKALINE PHOSPHATASE: 265 UNIT/L (ref 40–115)
ALKALINE PHOSPHATASE: 37 UNIT/L (ref 40–115)
ALKALINE PHOSPHATASE: 40 UNIT/L (ref 40–115)
ALKALINE PHOSPHATASE: 51 UNIT/L (ref 40–115)
ALKALINE PHOSPHATASE: 53 UNIT/L (ref 40–115)
ALKALINE PHOSPHATASE: 53 UNIT/L (ref 40–115)
ALKALINE PHOSPHATASE: 601 UNIT/L (ref 40–115)
ALKALINE PHOSPHATASE: 603 UNIT/L (ref 40–115)
ALKALINE PHOSPHATASE: 65 UNIT/L
ALKALINE PHOSPHATASE: 66 UNIT/L (ref 40–115)
ALKALINE PHOSPHATASE: 67 UNIT/L (ref 40–115)
ALKALINE PHOSPHATASE: 90 UNIT/L (ref 40–115)
ALT: 10 UNIT/L (ref 9–46)
ALT: 11 UNIT/L (ref 9–46)
ALT: 12 UNIT/L (ref 9–46)
ALT: 13 UNIT/L (ref 9–46)
ALT: 14 UNIT/L (ref 9–46)
ALT: 14 UNIT/L (ref 9–46)
ALT: 15 UNIT/L (ref 9–46)
ALT: 15 UNIT/L (ref 9–46)
ALT: 19 UNIT/L (ref 9–46)
ALT: 25 UNIT/L
ALT: 33 UNIT/L (ref 9–46)
ALT: 33 UNIT/L (ref 9–46)
ALT: 34 UNIT/L (ref 9–46)
ALT: 38 UNIT/L (ref 9–46)
ALT: 48 UNIT/L (ref 9–46)
ALT: 53 UNIT/L (ref 9–46)
ALT: 65 UNIT/L (ref 9–46)
ALT: 65 UNIT/L (ref 9–46)
ALT: 66 UNIT/L (ref 9–46)
ALT: 67 UNIT/L (ref 9–46)
ALT: 77 UNIT/L (ref 9–46)
AST: 12 UNIT/L (ref 10–35)
AST: 14 UNIT/L (ref 10–35)
AST: 15 UNIT/L (ref 10–35)
AST: 16 UNIT/L (ref 10–35)
AST: 16 UNIT/L (ref 10–35)
AST: 17 UNIT/L (ref 10–35)
AST: 18 UNIT/L
AST: 18 UNIT/L (ref 10–35)
AST: 18 UNIT/L (ref 10–35)
AST: 19 UNIT/L (ref 10–35)
AST: 21 UNIT/L (ref 10–35)
AST: 22 UNIT/L (ref 10–35)
AST: 22 UNIT/L (ref 10–35)
AST: 24 UNIT/L (ref 10–35)
AST: 27 UNIT/L (ref 10–35)
AST: 34 UNIT/L (ref 10–35)
AST: 41 UNIT/L (ref 10–35)
AST: 49 UNIT/L (ref 10–35)
BASO%: 0 %
BASO%: 0.1 %
BASO%: 0.2 %
BASO%: 0.3 %
BASO%: 0.4 %
BASO%: 0.5 %
BASO%: 0.6 %
BASO%: 0.7 %
BASO%: 0.8 %
BASO%: 0.9 %
BASO: 0 10^3/UL
BASO: 0.1 10^3/UL
BILIRUBIN, TOTAL: 0.9 MG/DL (ref 0.2–1.2)
BILIRUBIN, TOTAL: 1.2 MG/DL (ref 0.2–1.2)
BILIRUBIN, TOTAL: 1.2 MG/DL (ref 0.2–1.2)
BILIRUBIN, TOTAL: 1.3 MG/DL (ref 0.2–1.2)
BILIRUBIN, TOTAL: 1.4 MG/DL (ref 0.2–1.2)
BILIRUBIN, TOTAL: 1.5 MG/DL (ref 0.2–1.2)
BILIRUBIN, TOTAL: 1.6 MG/DL (ref 0.2–1.2)
BILIRUBIN, TOTAL: 1.8 MG/DL (ref 0.2–1.2)
BILIRUBIN, TOTAL: 2.1 MG/DL (ref 0.2–1.2)
BILIRUBIN, TOTAL: 2.2 MG/DL
BILIRUBIN, TOTAL: 2.4 MG/DL (ref 0.2–1.2)
BILIRUBIN, TOTAL: 2.4 MG/DL (ref 0.2–1.2)
BILIRUBIN, TOTAL: 2.6 MG/DL (ref 0.2–1.2)
BILIRUBIN, TOTAL: 3 MG/DL (ref 0.2–1.2)
BILIRUBIN, TOTAL: 3.4 MG/DL (ref 0.2–1.2)
BILIRUBIN, TOTAL: 3.9 MG/DL (ref 0.2–1.2)
BUN/CREATININE RATIO: 10 (CALC) (ref 6–22)
BUN/CREATININE RATIO: 11 (CALC) (ref 6–22)
BUN/CREATININE RATIO: 13 (CALC) (ref 6–22)
BUN/CREATININE RATIO: 14 (CALC)
BUN/CREATININE RATIO: 15 (CALC) (ref 6–22)
BUN/CREATININE RATIO: 15 (CALC) (ref 6–22)
BUN/CREATININE RATIO: 16 (CALC) (ref 6–22)
BUN/CREATININE RATIO: 16 (CALC) (ref 6–22)
BUN/CREATININE RATIO: 17 (CALC) (ref 6–22)
BUN/CREATININE RATIO: 18 (CALC) (ref 6–22)
BUN/CREATININE RATIO: 19 (CALC) (ref 6–22)
BUN/CREATININE RATIO: 21 (CALC) (ref 6–22)
BUN/CREATININE RATIO: 22 (CALC) (ref 6–22)
BUN/CREATININE RATIO: 22 (CALC) (ref 6–22)
BUN/CREATININE RATIO: 24 (CALC) (ref 6–22)
BUN/CREATININE RATIO: 25 (CALC) (ref 6–22)
BUN/CREATININE RATIO: 31 (CALC) (ref 6–22)
BUN/CREATININE RATIO: 34 (CALC) (ref 6–22)
BUN/CREATININE RATIO: ABNORMAL (CALC) (ref 6–22)
CALCIUM: 10.7 MG/DL
CALCIUM: 10.8 MG/DL (ref 8.6–10.3)
CALCIUM: 12.4 MG/DL (ref 8.6–10.3)
CALCIUM: 5.7 MG/DL (ref 8.6–10.3)
CALCIUM: 5.9 MG/DL (ref 8.6–10.3)
CALCIUM: 6.1 MG/DL (ref 8.6–10.3)
CALCIUM: 6.3 MG/DL (ref 8.6–10.3)
CALCIUM: 6.8 MG/DL (ref 8.6–10.3)
CALCIUM: 7 MG/DL (ref 8.6–10.3)
CALCIUM: 7.3 MG/DL (ref 8.6–10.3)
CALCIUM: 7.4 MG/DL (ref 8.6–10.3)
CALCIUM: 7.7 MG/DL (ref 8.6–10.3)
CALCIUM: 7.8 MG/DL (ref 8.6–10.3)
CALCIUM: 8 MG/DL (ref 8.6–10.3)
CALCIUM: 8.1 MG/DL (ref 8.6–10.3)
CALCIUM: 8.2 MG/DL (ref 8.6–10.3)
CALCIUM: 8.3 MG/DL (ref 8.6–10.3)
CALCIUM: 8.4 MG/DL (ref 8.6–10.3)
CALCIUM: 8.5 MG/DL (ref 8.6–10.3)
CALCIUM: 8.6 MG/DL (ref 8.6–10.3)
CALCIUM: 8.7 MG/DL (ref 8.6–10.3)
CALCIUM: 9.1 MG/DL (ref 8.6–10.3)
CALCIUM: 9.2 MG/DL (ref 8.6–10.3)
CALCIUM: 9.4 MG/DL (ref 8.6–10.3)
CARBON DIOXIDE: 16 MMOL/L (ref 20–32)
CARBON DIOXIDE: 18 MMOL/L (ref 20–32)
CARBON DIOXIDE: 18 MMOL/L (ref 20–32)
CARBON DIOXIDE: 19 MMOL/L (ref 20–32)
CARBON DIOXIDE: 20 MMOL/L (ref 20–32)
CARBON DIOXIDE: 21 MMOL/L (ref 20–32)
CARBON DIOXIDE: 22 MMOL/L (ref 20–32)
CARBON DIOXIDE: 22 MMOL/L (ref 20–32)
CARBON DIOXIDE: 23 MMOL/L (ref 20–32)
CARBON DIOXIDE: 25 MMOL/L
CARBON DIOXIDE: 25 MMOL/L (ref 20–32)
CARBON DIOXIDE: 26 MMOL/L (ref 20–32)
CHLORIDE: 100 MMOL/L (ref 98–110)
CHLORIDE: 100 MMOL/L (ref 98–110)
CHLORIDE: 101 MMOL/L (ref 98–110)
CHLORIDE: 102 MMOL/L (ref 98–110)
CHLORIDE: 103 MMOL/L (ref 98–110)
CHLORIDE: 103 MMOL/L (ref 98–110)
CHLORIDE: 104 MMOL/L (ref 98–110)
CHLORIDE: 104 MMOL/L (ref 98–110)
CHLORIDE: 106 MMOL/L
CHLORIDE: 106 MMOL/L (ref 98–110)
CHLORIDE: 107 MMOL/L (ref 98–110)
CHLORIDE: 108 MMOL/L (ref 98–110)
CHLORIDE: 109 MMOL/L (ref 98–110)
CHLORIDE: 110 MMOL/L (ref 98–110)
CHLORIDE: 111 MMOL/L (ref 98–110)
CHOL/HDLC RATIO: 1.8 (CALC)
CHOLESTEROL, TOTAL: 167 MG/DL
CRCL (C&G) (MOSAIQ HL): 100.26 ML/MIN
CRCL (C&G) (MOSAIQ HL): 100.98 ML/MIN
CRCL (C&G) (MOSAIQ HL): 102.35 ML/MIN
CRCL (C&G) (MOSAIQ HL): 104.32 ML/MIN
CRCL (C&G) (MOSAIQ HL): 105.93 ML/MIN
CRCL (C&G) (MOSAIQ HL): 106.26 ML/MIN
CRCL (C&G) (MOSAIQ HL): 110.79 ML/MIN
CRCL (C&G) (MOSAIQ HL): 113.61 ML/MIN
CRCL (C&G) (MOSAIQ HL): 116.98 ML/MIN
CRCL (C&G) (MOSAIQ HL): 51.44 ML/MIN
CRCL (C&G) (MOSAIQ HL): 68.13 ML/MIN
CRCL (C&G) (MOSAIQ HL): 69.79 ML/MIN
CRCL (C&G) (MOSAIQ HL): 72.07 ML/MIN
CRCL (C&G) (MOSAIQ HL): 79.74 ML/MIN
CRCL (C&G) (MOSAIQ HL): 80.74 ML/MIN
CRCL (C&G) (MOSAIQ HL): 81.8 ML/MIN
CRCL (C&G) (MOSAIQ HL): 82 ML/MIN
CRCL (C&G) (MOSAIQ HL): 85.22 ML/MIN
CRCL (C&G) (MOSAIQ HL): 85.61 ML/MIN
CRCL (C&G) (MOSAIQ HL): 88.91 ML/MIN
CRCL (C&G) (MOSAIQ HL): 93.61 ML/MIN
CRCL (C&G) (MOSAIQ HL): 95.56 ML/MIN
CRCL (C&G) (MOSAIQ HL): 96.26 ML/MIN
CRCL (C&G) (MOSAIQ HL): 97.02 ML/MIN
CRCL (C&G) (MOSAIQ HL): 98.49 ML/MIN
CRCL (C&G) (MOSAIQ HL): 99.99 ML/MIN
CREATININE CLEARANCE (MOSAIQ HL): 23.6 ML/MIN
CREATININE CLEARANCE (MOSAIQ HL): 32 ML/MIN
CREATININE CLEARANCE (MOSAIQ HL): 32.5 ML/MIN
CREATININE CLEARANCE (MOSAIQ HL): 35.1 ML/MIN
CREATININE CLEARANCE (MOSAIQ HL): 37 ML/MIN
CREATININE CLEARANCE (MOSAIQ HL): 38.3 ML/MIN
CREATININE CLEARANCE (MOSAIQ HL): 38.9 ML/MIN
CREATININE CLEARANCE (MOSAIQ HL): 39.4 ML/MIN
CREATININE CLEARANCE (MOSAIQ HL): 40.3 ML/MIN
CREATININE CLEARANCE (MOSAIQ HL): 40.9 ML/MIN
CREATININE CLEARANCE (MOSAIQ HL): 43.1 ML/MIN
CREATININE CLEARANCE (MOSAIQ HL): 44.5 ML/MIN
CREATININE CLEARANCE (MOSAIQ HL): 44.6 ML/MIN
CREATININE CLEARANCE (MOSAIQ HL): 44.6 ML/MIN
CREATININE CLEARANCE (MOSAIQ HL): 45.2 ML/MIN
CREATININE CLEARANCE (MOSAIQ HL): 45.2 ML/MIN
CREATININE CLEARANCE (MOSAIQ HL): 45.6 ML/MIN
CREATININE CLEARANCE (MOSAIQ HL): 46 ML/MIN
CREATININE CLEARANCE (MOSAIQ HL): 46 ML/MIN
CREATININE CLEARANCE (MOSAIQ HL): 48.8 ML/MIN
CREATININE CLEARANCE (MOSAIQ HL): 48.8 ML/MIN
CREATININE CLEARANCE (MOSAIQ HL): 51 ML/MIN
CREATININE CLEARANCE (MOSAIQ HL): 52 ML/MIN
CREATININE CLEARANCE (MOSAIQ HL): 53 ML/MIN
CREATININE: 1.05 MG/DL (ref 0.7–1.18)
CREATININE: 1.07 MG/DL (ref 0.7–1.18)
CREATININE: 1.09 MG/DL (ref 0.7–1.18)
CREATININE: 1.14 MG/DL (ref 0.7–1.18)
CREATININE: 1.14 MG/DL (ref 0.7–1.18)
CREATININE: 1.19 MG/DL (ref 0.7–1.18)
CREATININE: 1.21 MG/DL (ref 0.7–1.18)
CREATININE: 1.22 MG/DL (ref 0.7–1.18)
CREATININE: 1.23 MG/DL (ref 0.7–1.18)
CREATININE: 1.25 MG/DL (ref 0.7–1.18)
CREATININE: 1.27 MG/DL (ref 0.7–1.18)
CREATININE: 1.36 MG/DL (ref 0.7–1.18)
CREATININE: 1.36 MG/DL (ref 0.7–1.18)
CREATININE: 1.41 MG/DL (ref 0.7–1.18)
CREATININE: 1.43 MG/DL (ref 0.7–1.18)
CREATININE: 1.48 MG/DL (ref 0.7–1.18)
CREATININE: 1.56 MG/DL (ref 0.7–1.18)
CREATININE: 1.71 MG/DL
CREATININE: 1.71 MG/DL (ref 0.7–1.18)
CREATININE: 2.32 MG/DL (ref 0.7–1.18)
EGFR AFRICAN AMERICAN: 31 ML/MIN/1.73M2
EGFR AFRICAN AMERICAN: 45 ML/MIN/1.73M2
EGFR AFRICAN AMERICAN: 45 ML/MIN/1.73M2
EGFR AFRICAN AMERICAN: 50 ML/MIN/1.73M2
EGFR AFRICAN AMERICAN: 53 ML/MIN/1.73M2
EGFR AFRICAN AMERICAN: 56 ML/MIN/1.73M2
EGFR AFRICAN AMERICAN: 57 ML/MIN/1.73M2
EGFR AFRICAN AMERICAN: 59 ML/MIN/1.73M2
EGFR AFRICAN AMERICAN: 59 ML/MIN/1.73M2
EGFR AFRICAN AMERICAN: 64 ML/MIN/1.73M2
EGFR AFRICAN AMERICAN: 66 ML/MIN/1.73M2
EGFR AFRICAN AMERICAN: 67 ML/MIN/1.73M2
EGFR AFRICAN AMERICAN: 68 ML/MIN/1.73M2
EGFR AFRICAN AMERICAN: 68 ML/MIN/1.73M2
EGFR AFRICAN AMERICAN: 69 ML/MIN/1.73M2
EGFR AFRICAN AMERICAN: 74 ML/MIN/1.73M2
EGFR AFRICAN AMERICAN: 74 ML/MIN/1.73M2
EGFR AFRICAN AMERICAN: 78 ML/MIN/1.73M2
EGFR AFRICAN AMERICAN: 79 ML/MIN/1.73M2
EGFR AFRICAN AMERICAN: 81 ML/MIN/1.73M2
EGFR NON-AFR. AMERICAN: 27 ML/MIN/1.73M2
EGFR NON-AFR. AMERICAN: 39 ML/MIN/1.73M2
EGFR NON-AFR. AMERICAN: 39 ML/MIN/1.73M2
EGFR NON-AFR. AMERICAN: 43 ML/MIN/1.73M2
EGFR NON-AFR. AMERICAN: 46 ML/MIN/1.73M2
EGFR NON-AFR. AMERICAN: 48 ML/MIN/1.73M2
EGFR NON-AFR. AMERICAN: 49 ML/MIN/1.73M2
EGFR NON-AFR. AMERICAN: 51 ML/MIN/1.73M2
EGFR NON-AFR. AMERICAN: 51 ML/MIN/1.73M2
EGFR NON-AFR. AMERICAN: 55 ML/MIN/1.73M2
EGFR NON-AFR. AMERICAN: 57 ML/MIN/1.73M2
EGFR NON-AFR. AMERICAN: 58 ML/MIN/1.73M2
EGFR NON-AFR. AMERICAN: 59 ML/MIN/1.73M2
EGFR NON-AFR. AMERICAN: 60 ML/MIN/1.73M2
EGFR NON-AFR. AMERICAN: 63 ML/MIN/1.73M2
EGFR NON-AFR. AMERICAN: 63 ML/MIN/1.73M2
EGFR NON-AFR. AMERICAN: 67 ML/MIN/1.73M2
EGFR NON-AFR. AMERICAN: 68 ML/MIN/1.73M2
EGFR NON-AFR. AMERICAN: 70 ML/MIN/1.73M2
EOS%: 0 %
EOS%: 0 %
EOS%: 0.1 %
EOS%: 0.2 %
EOS%: 0.2 %
EOS%: 0.3 %
EOS%: 0.4 %
EOS%: 0.6 %
EOS%: 0.7 %
EOS%: 0.7 %
EOS%: 0.8 %
EOS%: 0.9 %
EOS%: 1 %
EOS%: 1.1 %
EOS%: 1.2 %
EOS%: 1.3 %
EOS%: 1.3 %
EOS%: 1.6 %
EOS%: 2.1 %
EOS%: 2.2 %
EOS%: 2.7 %
EOS: 0 10^3/UL
EOS: 0.1 10^3/UL
FERRITIN: 2414 NG/ML (ref 24–380)
FERRITIN: 281 NG/ML (ref 24–380)
FERRITIN: 778 NG/ML (ref 24–380)
FREE T4 INDEX (T7): 1.9 (ref 1.4–3.8)
FREE T4 INDEX (T7): 2 (ref 1.4–3.8)
FREE T4 INDEX (T7): 2.8 (ref 1.4–3.8)
FREE T4 INDEX (T7): 3.1 (ref 1.4–3.8)
GLOBULIN: 1.8 G/DL (CALC) (ref 1.9–3.7)
GLOBULIN: 1.9 G/DL (CALC) (ref 1.9–3.7)
GLOBULIN: 2 G/DL (CALC) (ref 1.9–3.7)
GLOBULIN: 2 G/DL (CALC) (ref 1.9–3.7)
GLOBULIN: 2.1 G/DL (CALC) (ref 1.9–3.7)
GLOBULIN: 2.2 G/DL (CALC) (ref 1.9–3.7)
GLOBULIN: 2.3 G/DL (CALC) (ref 1.9–3.7)
GLOBULIN: 2.4 G/DL (CALC) (ref 1.9–3.7)
GLOBULIN: 2.4 G/DL (CALC) (ref 1.9–3.7)
GLOBULIN: 2.5 G/DL (CALC) (ref 1.9–3.7)
GLOBULIN: 2.6 G/DL (CALC) (ref 1.9–3.7)
GLOBULIN: 2.9 G/DL (CALC)
GLOBULIN: 2.9 G/DL (CALC) (ref 1.9–3.7)
GLOBULIN: 3.2 G/DL (CALC) (ref 1.9–3.7)
GLUCOSE: 100 MG/DL
GLUCOSE: 103 MG/DL (ref 65–99)
GLUCOSE: 103 MG/DL (ref 65–99)
GLUCOSE: 105 MG/DL (ref 65–99)
GLUCOSE: 106 MG/DL (ref 65–99)
GLUCOSE: 109 MG/DL (ref 65–99)
GLUCOSE: 109 MG/DL (ref 65–99)
GLUCOSE: 111 MG/DL (ref 65–99)
GLUCOSE: 112 MG/DL (ref 65–99)
GLUCOSE: 113 MG/DL (ref 65–99)
GLUCOSE: 119 MG/DL (ref 65–99)
GLUCOSE: 126 MG/DL (ref 65–99)
GLUCOSE: 165 MG/DL (ref 65–99)
GLUCOSE: 78 MG/DL (ref 65–99)
GLUCOSE: 80 MG/DL (ref 65–99)
GLUCOSE: 85 MG/DL (ref 65–99)
GLUCOSE: 87 MG/DL (ref 65–99)
GLUCOSE: 88 MG/DL (ref 65–99)
GLUCOSE: 90 MG/DL (ref 65–99)
GLUCOSE: 90 MG/DL (ref 65–99)
GLUCOSE: 93 MG/DL (ref 65–99)
GLUCOSE: 94 MG/DL (ref 65–99)
GLUCOSE: 95 MG/DL (ref 65–99)
GLUCOSE: 96 MG/DL (ref 65–99)
GLUCOSE: 97 MG/DL (ref 65–99)
GLUCOSE: 99 MG/DL (ref 65–99)
HCT: 21.9 % (ref 38–54)
HCT: 23.2 % (ref 38–54)
HCT: 24 % (ref 38–54)
HCT: 25.1 % (ref 38–54)
HCT: 25.6 % (ref 38–54)
HCT: 25.7 % (ref 38–54)
HCT: 25.7 % (ref 38–54)
HCT: 25.8 % (ref 38–54)
HCT: 25.9 % (ref 38–54)
HCT: 26.1 % (ref 38–54)
HCT: 26.2 % (ref 38–54)
HCT: 26.6 % (ref 38–54)
HCT: 27.6 % (ref 38–54)
HCT: 27.7 % (ref 38–54)
HCT: 27.8 % (ref 38–54)
HCT: 28.1 % (ref 38–54)
HCT: 28.3 % (ref 38–54)
HCT: 28.6 % (ref 38–54)
HCT: 29 % (ref 38–54)
HCT: 29.3 % (ref 38–54)
HCT: 29.5 % (ref 38–54)
HCT: 30.6 %
HCT: 30.6 % (ref 38–54)
HCT: 30.7 % (ref 38–54)
HCT: 32 % (ref 38–54)
HCT: 32.2 % (ref 38–54)
HCT: 33.1 % (ref 38–54)
HCT: 33.2 % (ref 38–54)
HCT: 33.4 % (ref 38–54)
HCT: 33.5 % (ref 38–54)
HCT: 33.9 % (ref 38–54)
HCT: 34.1 % (ref 38–54)
HCT: 34.6 % (ref 38–54)
HCT: 35.1 % (ref 38–54)
HCT: 36.5 % (ref 38–54)
HCT: 37.3 % (ref 38–54)
HCT: 40.4 % (ref 38–54)
HCT: 40.6 % (ref 38–54)
HCT: 40.9 % (ref 38–54)
HCT: 40.9 % (ref 38–54)
HCT: 42 % (ref 38–54)
HCT: 42.6 % (ref 38–54)
HDL CHOLESTEROL: 95 MG/DL
HEMOGLOBIN A1C: 5.4 % OF TOTAL HGB
HGB: 10.1 G/DL
HGB: 10.2 G/DL (ref 12–18)
HGB: 10.6 G/DL (ref 12–18)
HGB: 10.8 G/DL (ref 12–18)
HGB: 10.9 G/DL (ref 12–18)
HGB: 11 G/DL (ref 12–18)
HGB: 11.1 G/DL (ref 12–18)
HGB: 11.5 G/DL (ref 12–18)
HGB: 11.6 G/DL (ref 12–18)
HGB: 13 G/DL (ref 12–18)
HGB: 13.1 G/DL (ref 12–18)
HGB: 13.4 G/DL (ref 12–18)
HGB: 13.5 G/DL (ref 12–18)
HGB: 13.6 G/DL (ref 12–18)
HGB: 13.8 G/DL (ref 12–18)
HGB: 6.9 G/DL (ref 12–18)
HGB: 7.2 G/DL (ref 12–18)
HGB: 7.5 G/DL (ref 12–18)
HGB: 7.9 G/DL (ref 12–18)
HGB: 7.9 G/DL (ref 12–18)
HGB: 8.1 G/DL (ref 12–18)
HGB: 8.2 G/DL (ref 12–18)
HGB: 8.3 G/DL (ref 12–18)
HGB: 8.5 G/DL (ref 12–18)
HGB: 8.7 G/DL (ref 12–18)
HGB: 8.8 G/DL (ref 12–18)
HGB: 8.9 G/DL (ref 12–18)
HGB: 9 G/DL (ref 12–18)
HGB: 9.2 G/DL (ref 12–18)
HGB: 9.3 G/DL (ref 12–18)
HGB: 9.4 G/DL (ref 12–18)
HGB: 9.7 G/DL (ref 12–18)
HGB: 9.8 G/DL (ref 12–18)
HGB: 9.9 G/DL (ref 12–18)
IRON BINDING CAPACITY: 213 MCG/DL (CALC) (ref 250–425)
IRON BINDING CAPACITY: 251 MCG/DL (CALC) (ref 250–425)
IRON BINDING CAPACITY: 272 MCG/DL (CALC) (ref 250–425)
IRON, TOTAL: 52 MCG/DL (ref 50–180)
IRON, TOTAL: 78 MCG/DL (ref 50–180)
IRON, TOTAL: 96 MCG/DL (ref 50–180)
LD: 181 UNIT/L (ref 120–250)
LD: 215 UNIT/L (ref 120–250)
LD: 225 UNIT/L (ref 120–250)
LD: 238 UNIT/L (ref 120–250)
LD: 264 UNIT/L (ref 120–250)
LD: 395 UNIT/L (ref 120–250)
LD: 467 UNIT/L (ref 120–250)
LD: 488 UNIT/L (ref 120–250)
LD: 618 UNIT/L (ref 120–250)
LDL-CHOLESTEROL: 51 MG/DL (CALC)
LYMPH%: 1.7 % (ref 12–44)
LYMPH%: 10.3 % (ref 12–44)
LYMPH%: 10.7 % (ref 12–44)
LYMPH%: 10.9 % (ref 12–44)
LYMPH%: 11.1 % (ref 12–44)
LYMPH%: 11.2 % (ref 12–44)
LYMPH%: 11.4 % (ref 12–44)
LYMPH%: 12.3 % (ref 12–44)
LYMPH%: 12.6 % (ref 12–44)
LYMPH%: 12.7 % (ref 12–44)
LYMPH%: 13.1 % (ref 12–44)
LYMPH%: 13.1 % (ref 12–44)
LYMPH%: 13.4 % (ref 12–44)
LYMPH%: 14.2 % (ref 12–44)
LYMPH%: 14.8 % (ref 12–44)
LYMPH%: 15 % (ref 12–44)
LYMPH%: 15.6 % (ref 12–44)
LYMPH%: 15.7 % (ref 12–44)
LYMPH%: 15.8 % (ref 12–44)
LYMPH%: 16.5 % (ref 12–44)
LYMPH%: 16.7 % (ref 12–44)
LYMPH%: 16.7 % (ref 12–44)
LYMPH%: 17.1 % (ref 12–44)
LYMPH%: 17.8 % (ref 12–44)
LYMPH%: 17.9 % (ref 12–44)
LYMPH%: 18.1 % (ref 12–44)
LYMPH%: 19.7 % (ref 12–44)
LYMPH%: 2.8 % (ref 12–44)
LYMPH%: 20.2 % (ref 12–44)
LYMPH%: 20.2 % (ref 12–44)
LYMPH%: 24.8 % (ref 12–44)
LYMPH%: 48.1 % (ref 12–44)
LYMPH%: 5.1 % (ref 12–44)
LYMPH%: 5.2 % (ref 12–44)
LYMPH%: 6.6 % (ref 12–44)
LYMPH%: 6.8 % (ref 12–44)
LYMPH%: 7.4 % (ref 12–44)
LYMPH%: 7.7 % (ref 12–44)
LYMPH%: 7.9 % (ref 12–44)
LYMPH%: 8.4 % (ref 12–44)
LYMPH%: 8.7 % (ref 12–44)
LYMPH%: 9.3 % (ref 12–44)
LYMPH%: 9.8 % (ref 12–44)
LYMPH: 0.2 10^3/UL (ref 0.8–2.8)
LYMPH: 0.3 10^3/UL (ref 0.8–2.8)
LYMPH: 0.4 10^3/UL (ref 0.8–2.8)
LYMPH: 0.5 10^3/UL (ref 0.8–2.8)
LYMPH: 0.6 10^3/UL (ref 0.8–2.8)
LYMPH: 0.6 10^3/UL (ref 0.8–2.8)
LYMPH: 0.7 10^3/UL (ref 0.8–2.8)
LYMPH: 0.8 10^3/UL (ref 0.8–2.8)
LYMPH: 0.9 10^3/UL (ref 0.8–2.8)
LYMPH: 1 10^3/UL (ref 0.8–2.8)
LYMPH: 1.1 10^3/UL (ref 0.8–2.8)
LYMPH: 1.4 10^3/UL (ref 0.8–2.8)
LYMPH: 1.4 10^3/UL (ref 0.8–2.8)
LYMPH: 1.9 10^3/UL (ref 0.8–2.8)
LYMPH: 2.5 10^3/UL (ref 0.8–2.8)
MAGNESIUM: 1.5 MG/DL (ref 1.5–2.5)
MCH: 28 PG (ref 26–33)
MCH: 28.1 PG (ref 26–33)
MCH: 28.1 PG (ref 26–33)
MCH: 28.7 PG (ref 26–33)
MCH: 29 PG (ref 26–33)
MCH: 29.1 PG (ref 26–33)
MCH: 29.2 PG (ref 26–33)
MCH: 29.5 PG (ref 26–33)
MCH: 29.9 PG (ref 26–33)
MCH: 30.2 PG (ref 26–33)
MCH: 30.3 PG (ref 26–33)
MCH: 30.5 PG (ref 26–33)
MCH: 30.5 PG (ref 26–33)
MCH: 30.6 PG (ref 26–33)
MCH: 30.7 PG (ref 26–33)
MCH: 30.7 PG (ref 26–33)
MCH: 30.8 PG (ref 26–33)
MCH: 30.9 PG (ref 26–33)
MCH: 31.1 PG (ref 26–33)
MCH: 31.2 PG (ref 26–33)
MCH: 31.3 PG (ref 26–33)
MCH: 31.5 PG (ref 26–33)
MCH: 31.6 PG (ref 26–33)
MCH: 31.6 PG (ref 26–33)
MCH: 31.8 PG (ref 26–33)
MCH: 31.9 PG (ref 26–33)
MCH: 32.1 PG (ref 26–33)
MCH: 32.2 PG
MCH: 32.3 PG (ref 26–33)
MCH: 32.4 PG (ref 26–33)
MCH: 32.4 PG (ref 26–33)
MCH: 32.5 PG (ref 26–33)
MCH: 32.5 PG (ref 26–33)
MCH: 32.6 PG (ref 26–33)
MCH: 32.8 PG (ref 26–33)
MCH: 32.8 PG (ref 26–33)
MCHC: 30.7 G/DL (ref 31–36)
MCHC: 30.9 G/DL (ref 31–36)
MCHC: 30.9 G/DL (ref 31–36)
MCHC: 31 G/DL (ref 31–36)
MCHC: 31.1 G/DL (ref 31–36)
MCHC: 31.2 G/DL (ref 31–36)
MCHC: 31.3 G/DL (ref 31–36)
MCHC: 31.3 G/DL (ref 31–36)
MCHC: 31.4 G/DL (ref 31–36)
MCHC: 31.4 G/DL (ref 31–36)
MCHC: 31.5 G/DL (ref 31–36)
MCHC: 31.6 G/DL (ref 31–36)
MCHC: 31.6 G/DL (ref 31–36)
MCHC: 31.7 G/DL (ref 31–36)
MCHC: 31.9 G/DL (ref 31–36)
MCHC: 32 G/DL (ref 31–36)
MCHC: 32.2 G/DL (ref 31–36)
MCHC: 32.4 G/DL (ref 31–36)
MCHC: 32.5 G/DL (ref 31–36)
MCHC: 32.7 G/DL (ref 31–36)
MCHC: 32.9 G/DL (ref 31–36)
MCHC: 32.9 G/DL (ref 31–36)
MCHC: 33 G/DL
MCHC: 33 G/DL (ref 31–36)
MCHC: 33.1 G/DL (ref 31–36)
MCHC: 33.3 G/DL (ref 31–36)
MCV: 100.3 FML (ref 82–100)
MCV: 100.4 FML (ref 82–100)
MCV: 100.7 FML (ref 82–100)
MCV: 100.7 FML (ref 82–100)
MCV: 100.8 FML (ref 82–100)
MCV: 101.1 FML (ref 82–100)
MCV: 101.6 FML (ref 82–100)
MCV: 101.7 FML (ref 82–100)
MCV: 101.8 FML (ref 82–100)
MCV: 101.9 FML (ref 82–100)
MCV: 103.1 FML (ref 82–100)
MCV: 105 FML (ref 82–100)
MCV: 105.5 FML (ref 82–100)
MCV: 86.8 FML (ref 82–100)
MCV: 88.9 FML (ref 82–100)
MCV: 89.3 FML (ref 82–100)
MCV: 90.6 FML (ref 82–100)
MCV: 91.5 FML (ref 82–100)
MCV: 92.4 FML (ref 82–100)
MCV: 94.5 FML (ref 82–100)
MCV: 94.9 FML (ref 82–100)
MCV: 95.2 FML (ref 82–100)
MCV: 96.1 FML (ref 82–100)
MCV: 96.2 FML (ref 82–100)
MCV: 96.3 FML (ref 82–100)
MCV: 96.3 FML (ref 82–100)
MCV: 96.5 FML (ref 82–100)
MCV: 96.7 FML (ref 82–100)
MCV: 96.8 FML (ref 82–100)
MCV: 96.9 FML (ref 82–100)
MCV: 97.3 FML (ref 82–100)
MCV: 97.4 FML (ref 82–100)
MCV: 97.5 FML
MCV: 97.5 FML (ref 82–100)
MCV: 97.6 FML (ref 82–100)
MCV: 97.7 FML (ref 82–100)
MCV: 98.2 FML (ref 82–100)
MCV: 98.5 FML (ref 82–100)
MCV: 98.8 FML (ref 82–100)
MCV: 99.2 FML (ref 82–100)
MCV: 99.2 FML (ref 82–100)
MCV: 99.3 FML (ref 82–100)
MCV: 99.6 FML (ref 82–100)
MCV: 99.7 FML (ref 82–100)
MONO%: 1.9 % (ref 2–12)
MONO%: 10.1 % (ref 2–12)
MONO%: 10.4 % (ref 2–12)
MONO%: 10.5 % (ref 2–12)
MONO%: 10.7 % (ref 2–12)
MONO%: 11.2 % (ref 2–12)
MONO%: 11.2 % (ref 2–12)
MONO%: 11.9 % (ref 2–12)
MONO%: 12.6 % (ref 2–12)
MONO%: 12.8 % (ref 2–12)
MONO%: 12.9 % (ref 2–12)
MONO%: 13.6 % (ref 2–12)
MONO%: 13.9 % (ref 2–12)
MONO%: 14.4 % (ref 2–12)
MONO%: 14.4 % (ref 2–12)
MONO%: 15 % (ref 2–12)
MONO%: 15.3 % (ref 2–12)
MONO%: 15.9 % (ref 2–12)
MONO%: 16.6 % (ref 2–12)
MONO%: 17.1 % (ref 2–12)
MONO%: 17.3 % (ref 2–12)
MONO%: 17.4 % (ref 2–12)
MONO%: 3.1 % (ref 2–12)
MONO%: 3.9 % (ref 2–12)
MONO%: 3.9 % (ref 2–12)
MONO%: 4.2 % (ref 2–12)
MONO%: 4.5 % (ref 2–12)
MONO%: 4.6 % (ref 2–12)
MONO%: 5.7 % (ref 2–12)
MONO%: 6 % (ref 2–12)
MONO%: 6.1 % (ref 2–12)
MONO%: 6.1 % (ref 2–12)
MONO%: 6.3 % (ref 2–12)
MONO%: 7.4 % (ref 2–12)
MONO%: 7.5 % (ref 2–12)
MONO%: 7.6 % (ref 2–12)
MONO%: 7.9 % (ref 2–12)
MONO%: 9.1 % (ref 2–12)
MONO%: 9.3 % (ref 2–12)
MONO%: 9.4 % (ref 2–12)
MONO%: 9.7 % (ref 2–12)
MONO%: 9.8 % (ref 2–12)
MONO%: 9.9 % (ref 2–12)
MONO: 0.1 10^3/UL (ref 0.2–1)
MONO: 0.1 10^3/UL (ref 0.2–1)
MONO: 0.2 10^3/UL (ref 0.2–1)
MONO: 0.3 10^3/UL (ref 0.2–1)
MONO: 0.4 10^3/UL (ref 0.2–1)
MONO: 0.5 10^3/UL (ref 0.2–1)
MONO: 0.6 10^3/UL (ref 0.2–1)
MONO: 0.7 10^3/UL (ref 0.2–1)
MONO: 0.7 10^3/UL (ref 0.2–1)
MONO: 0.8 10^3/UL (ref 0.2–1)
MONO: 0.9 10^3/UL (ref 0.2–1)
MONO: 1 10^3/UL (ref 0.2–1)
MONO: 1.1 10^3/UL (ref 0.2–1)
MONO: 1.1 10^3/UL (ref 0.2–1)
MONO: 1.2 10^3/UL (ref 0.2–1)
MONO: 1.2 10^3/UL (ref 0.2–1)
MONO: 2.4 10^3/UL (ref 0.2–1)
MPV: 10.2 FML (ref 8.6–11.7)
MPV: 10.3 FML (ref 8.6–11.7)
MPV: 10.4 FML (ref 8.6–11.7)
MPV: 10.4 FML (ref 8.6–11.7)
MPV: 10.5 FML (ref 8.6–11.7)
MPV: 11 FML (ref 8.6–11.7)
MPV: 11 FML (ref 8.6–11.7)
MPV: 11.7 FML (ref 8.6–11.7)
MPV: 8.3 FML (ref 8.6–11.7)
MPV: 8.4 FML (ref 8.6–11.7)
MPV: 8.6 FML (ref 8.6–11.7)
MPV: 8.7 FML (ref 8.6–11.7)
MPV: 8.7 FML (ref 8.6–11.7)
MPV: 8.8 FML (ref 8.6–11.7)
MPV: 8.9 FML (ref 8.6–11.7)
MPV: 9 FML (ref 8.6–11.7)
MPV: 9.1 FML (ref 8.6–11.7)
MPV: 9.2 FML (ref 8.6–11.7)
MPV: 9.3 FML (ref 8.6–11.7)
MPV: 9.3 FML (ref 8.6–11.7)
MPV: 9.4 FML (ref 8.6–11.7)
MPV: 9.5 FML (ref 8.6–11.7)
MPV: 9.5 FML (ref 8.6–11.7)
MPV: 9.6 FML (ref 8.6–11.7)
MPV: 9.8 FML (ref 8.6–11.7)
MPV: 9.9 FML (ref 8.6–11.7)
MPV: 9.9 FML (ref 8.6–11.7)
NEUT%: 42 % (ref 47–76)
NEUT%: 58.7 % (ref 47–76)
NEUT%: 61.2 % (ref 47–76)
NEUT%: 62.3 % (ref 47–76)
NEUT%: 63.4 % (ref 47–76)
NEUT%: 64.7 % (ref 47–76)
NEUT%: 65.4 % (ref 47–76)
NEUT%: 67.2 % (ref 47–76)
NEUT%: 67.5 % (ref 47–76)
NEUT%: 67.7 % (ref 47–76)
NEUT%: 68.9 % (ref 47–76)
NEUT%: 69.1 % (ref 47–76)
NEUT%: 69.8 % (ref 47–76)
NEUT%: 70.9 % (ref 47–76)
NEUT%: 71 % (ref 47–76)
NEUT%: 71.2 % (ref 47–76)
NEUT%: 71.5 % (ref 47–76)
NEUT%: 71.9 % (ref 47–76)
NEUT%: 71.9 % (ref 47–76)
NEUT%: 72.3 % (ref 47–76)
NEUT%: 75 % (ref 47–76)
NEUT%: 75.5 % (ref 47–76)
NEUT%: 77.6 % (ref 47–76)
NEUT%: 77.9 % (ref 47–76)
NEUT%: 78 % (ref 47–76)
NEUT%: 80.4 % (ref 47–76)
NEUT%: 80.5 % (ref 47–76)
NEUT%: 80.7 % (ref 47–76)
NEUT%: 81.5 % (ref 47–76)
NEUT%: 81.6 % (ref 47–76)
NEUT%: 82.4 % (ref 47–76)
NEUT%: 82.5 % (ref 47–76)
NEUT%: 83.3 % (ref 47–76)
NEUT%: 84.2 % (ref 47–76)
NEUT%: 84.2 % (ref 47–76)
NEUT%: 84.8 % (ref 47–76)
NEUT%: 85.8 % (ref 47–76)
NEUT%: 86.9 % (ref 47–76)
NEUT%: 87.5 % (ref 47–76)
NEUT%: 88.1 % (ref 47–76)
NEUT%: 88.2 % (ref 47–76)
NEUT%: 93.7 % (ref 47–76)
NEUT%: 94.6 % (ref 47–76)
NEUT: 0.3 10^3/UL (ref 1.5–7.1)
NEUT: 1.2 10^3/UL (ref 1.5–7.1)
NEUT: 1.3 10^3/UL (ref 1.5–7.1)
NEUT: 10.1 10^3/UL (ref 1.5–7.1)
NEUT: 11.2 10^3/UL (ref 1.5–7.1)
NEUT: 14.6 10^3/UL (ref 1.5–7.1)
NEUT: 17.3 10^3/UL (ref 1.5–7.1)
NEUT: 2.1 10^3/UL (ref 1.5–7.1)
NEUT: 2.1 10^3/UL (ref 1.5–7.1)
NEUT: 2.9 10^3/UL (ref 1.5–7.1)
NEUT: 3 10^3/UL (ref 1.5–7.1)
NEUT: 3.6 10^3/UL (ref 1.5–7.1)
NEUT: 3.7 10^3/UL (ref 1.5–7.1)
NEUT: 3.9 10^3/UL (ref 1.5–7.1)
NEUT: 4 10^3/UL (ref 1.5–7.1)
NEUT: 4.1 10^3/UL (ref 1.5–7.1)
NEUT: 4.1 10^3/UL (ref 1.5–7.1)
NEUT: 4.2 10^3/UL (ref 1.5–7.1)
NEUT: 4.3 10^3/UL (ref 1.5–7.1)
NEUT: 4.5 10^3/UL (ref 1.5–7.1)
NEUT: 4.6 10^3/UL (ref 1.5–7.1)
NEUT: 4.7 10^3/UL (ref 1.5–7.1)
NEUT: 5 10^3/UL (ref 1.5–7.1)
NEUT: 5.2 10^3/UL (ref 1.5–7.1)
NEUT: 5.3 10^3/UL (ref 1.5–7.1)
NEUT: 5.3 10^3/UL (ref 1.5–7.1)
NEUT: 5.4 10^3/UL (ref 1.5–7.1)
NEUT: 5.5 10^3/UL (ref 1.5–7.1)
NEUT: 5.7 10^3/UL (ref 1.5–7.1)
NEUT: 5.8 10^3/UL (ref 1.5–7.1)
NEUT: 5.9 10^3/UL (ref 1.5–7.1)
NEUT: 6.5 10^3/UL (ref 1.5–7.1)
NEUT: 7.1 10^3/UL (ref 1.5–7.1)
NEUT: 7.3 10^3/UL (ref 1.5–7.1)
NEUT: 7.4 10^3/UL (ref 1.5–7.1)
NEUT: 7.4 10^3/UL (ref 1.5–7.1)
NEUT: 9.1 10^3/UL (ref 1.5–7.1)
NON-HDL CHOLESTEROL: 72 MG/DL (CALC)
PLT: 10 10^3/UL (ref 150–375)
PLT: 100 10^3/UL (ref 150–375)
PLT: 101 10^3/UL (ref 150–375)
PLT: 101 10^3/UL (ref 150–375)
PLT: 103 10^3/UL (ref 150–375)
PLT: 107 10^3/UL (ref 150–375)
PLT: 109 10^3/UL (ref 150–375)
PLT: 113 10^3/UL (ref 150–375)
PLT: 115 10^3/UL (ref 150–375)
PLT: 115 10^3/UL (ref 150–375)
PLT: 129 10^3/UL (ref 150–375)
PLT: 131 10^3/UL (ref 150–375)
PLT: 136 10^3/UL (ref 150–375)
PLT: 141 10^3/UL (ref 150–375)
PLT: 147 10^3/UL (ref 150–375)
PLT: 151 10^3/UL (ref 150–375)
PLT: 155 10^3/UL (ref 150–375)
PLT: 160 10^3/UL (ref 150–375)
PLT: 17 10^3/UL (ref 150–375)
PLT: 20 10^3/UL
PLT: 208 10^3/UL (ref 150–375)
PLT: 210 10^3/UL (ref 150–375)
PLT: 30 10^3/UL (ref 150–375)
PLT: 30 10^3/UL (ref 150–375)
PLT: 35 10^3/UL (ref 150–375)
PLT: 43 10^3/UL (ref 150–375)
PLT: 45 10^3/UL (ref 150–375)
PLT: 55 10^3/UL (ref 150–375)
PLT: 57 10^3/UL (ref 150–375)
PLT: 57 10^3/UL (ref 150–375)
PLT: 59 10^3/UL (ref 150–375)
PLT: 60 10^3/UL (ref 150–375)
PLT: 61 10^3/UL (ref 150–375)
PLT: 66 10^3/UL (ref 150–375)
PLT: 67 10^3/UL (ref 150–375)
PLT: 68 10^3/UL (ref 150–375)
PLT: 70 10^3/UL (ref 150–375)
PLT: 70 10^3/UL (ref 150–375)
PLT: 74 10^3/UL (ref 150–375)
PLT: 79 10^3/UL (ref 150–375)
PLT: 83 10^3/UL (ref 150–375)
PLT: 86 10^3/UL (ref 150–375)
PLT: 87 10^3/UL (ref 150–375)
PLT: 88 10^3/UL (ref 150–375)
POTASSIUM: 3.3 MMOL/L
POTASSIUM: 3.5 MMOL/L (ref 3.5–5.3)
POTASSIUM: 3.6 MMOL/L (ref 3.5–5.3)
POTASSIUM: 3.7 MMOL/L (ref 3.5–5.3)
POTASSIUM: 3.9 MMOL/L (ref 3.5–5.3)
POTASSIUM: 4 MMOL/L (ref 3.5–5.3)
POTASSIUM: 4 MMOL/L (ref 3.5–5.3)
POTASSIUM: 4.1 MMOL/L (ref 3.5–5.3)
POTASSIUM: 4.2 MMOL/L (ref 3.5–5.3)
POTASSIUM: 4.3 MMOL/L (ref 3.5–5.3)
POTASSIUM: 4.4 MMOL/L (ref 3.5–5.3)
POTASSIUM: 4.4 MMOL/L (ref 3.5–5.3)
POTASSIUM: 4.5 MMOL/L (ref 3.5–5.3)
POTASSIUM: 4.6 MMOL/L (ref 3.5–5.3)
POTASSIUM: 4.6 MMOL/L (ref 3.5–5.3)
POTASSIUM: 4.8 MMOL/L (ref 3.5–5.3)
PROTEIN, TOTAL: 5.4 G/DL (ref 6.1–8.1)
PROTEIN, TOTAL: 5.7 G/DL (ref 6.1–8.1)
PROTEIN, TOTAL: 5.9 G/DL (ref 6.1–8.1)
PROTEIN, TOTAL: 5.9 G/DL (ref 6.1–8.1)
PROTEIN, TOTAL: 6 G/DL (ref 6.1–8.1)
PROTEIN, TOTAL: 6.1 G/DL (ref 6.1–8.1)
PROTEIN, TOTAL: 6.2 G/DL (ref 6.1–8.1)
PROTEIN, TOTAL: 6.3 G/DL (ref 6.1–8.1)
PROTEIN, TOTAL: 6.4 G/DL
PROTEIN, TOTAL: 6.4 G/DL (ref 6.1–8.1)
PROTEIN, TOTAL: 6.4 G/DL (ref 6.1–8.1)
PROTEIN, TOTAL: 6.6 G/DL (ref 6.1–8.1)
PROTEIN, TOTAL: 6.7 G/DL (ref 6.1–8.1)
PROTEIN, TOTAL: 6.7 G/DL (ref 6.1–8.1)
RBC: 2.21 10^6/UL (ref 4.2–6.2)
RBC: 2.37 10^6/UL (ref 4.2–6.2)
RBC: 2.48 10^6/UL (ref 4.2–6.2)
RBC: 2.52 10^6/UL (ref 4.2–6.2)
RBC: 2.56 10^6/UL (ref 4.2–6.2)
RBC: 2.58 10^6/UL (ref 4.2–6.2)
RBC: 2.65 10^6/UL (ref 4.2–6.2)
RBC: 2.71 10^6/UL (ref 4.2–6.2)
RBC: 2.72 10^6/UL (ref 4.2–6.2)
RBC: 2.73 10^6/UL (ref 4.2–6.2)
RBC: 2.73 10^6/UL (ref 4.2–6.2)
RBC: 2.75 10^6/UL (ref 4.2–6.2)
RBC: 2.81 10^6/UL (ref 4.2–6.2)
RBC: 2.86 10^6/UL (ref 4.2–6.2)
RBC: 2.88 10^6/UL (ref 4.2–6.2)
RBC: 2.88 10^6/UL (ref 4.2–6.2)
RBC: 2.93 10^6/UL (ref 4.2–6.2)
RBC: 2.93 10^6/UL (ref 4.2–6.2)
RBC: 3.01 10^6/UL (ref 4.2–6.2)
RBC: 3.14 10^6/UL
RBC: 3.14 10^6/UL (ref 4.2–6.2)
RBC: 3.14 10^6/UL (ref 4.2–6.2)
RBC: 3.19 10^6/UL (ref 4.2–6.2)
RBC: 3.3 10^6/UL (ref 4.2–6.2)
RBC: 3.31 10^6/UL (ref 4.2–6.2)
RBC: 3.33 10^6/UL (ref 4.2–6.2)
RBC: 3.38 10^6/UL (ref 4.2–6.2)
RBC: 3.47 10^6/UL (ref 4.2–6.2)
RBC: 3.54 10^6/UL (ref 4.2–6.2)
RBC: 3.55 10^6/UL (ref 4.2–6.2)
RBC: 3.57 10^6/UL (ref 4.2–6.2)
RBC: 3.8 10^6/UL (ref 4.2–6.2)
RBC: 3.86 10^6/UL (ref 4.2–6.2)
RBC: 3.95 10^6/UL (ref 4.2–6.2)
RBC: 4.12 10^6/UL (ref 4.2–6.2)
RBC: 4.18 10^6/UL (ref 4.2–6.2)
RBC: 4.2 10^6/UL (ref 4.2–6.2)
RBC: 4.24 10^6/UL (ref 4.2–6.2)
RBC: 4.29 10^6/UL (ref 4.2–6.2)
RBC: 4.41 10^6/UL (ref 4.2–6.2)
RDW-CV: 14.5 %
RDW-CV: 14.7 %
RDW-CV: 14.9 %
RDW-CV: 15 %
RDW-CV: 15.2 %
RDW-CV: 15.6 %
RDW-CV: 15.7 %
RDW-CV: 15.8 %
RDW-CV: 16 %
RDW-CV: 16 %
RDW-CV: 16.2 %
RDW-CV: 16.8 %
RDW-CV: 17.3 %
RDW-CV: 17.3 %
RDW-CV: 17.5 %
RDW-CV: 17.7 %
RDW-CV: 17.7 %
RDW-CV: 17.8 %
RDW-CV: 18.2 %
RDW-CV: 18.6 %
RDW-CV: 18.9 %
RDW-CV: 19 %
RDW-CV: 19.3 %
RDW-CV: 19.4 %
RDW-CV: 19.7 %
RDW-CV: 19.9 %
RDW-CV: 20 %
RDW-CV: 20 %
RDW-CV: 20.3 %
RDW-CV: 20.3 %
RDW-CV: 20.9 %
RDW-CV: 21.2 %
RDW-CV: 21.4 %
RDW-CV: 21.5 %
RDW-CV: 21.7 %
RDW-CV: 22.1 %
RDW-CV: 22.7 %
RDW-CV: 23 %
RDW-CV: 23.2 %
RDW-CV: 24 %
RDW-CV: 24 %
RDW-SD: 48.2 FML (ref 36–50)
RDW-SD: 48.6 FML (ref 36–50)
RDW-SD: 48.7 FML (ref 36–50)
RDW-SD: 49.7 FML (ref 36–50)
RDW-SD: 50.5 FML (ref 36–50)
RDW-SD: 51 FML (ref 36–50)
RDW-SD: 51.2 FML (ref 36–50)
RDW-SD: 51.9 FML (ref 36–50)
RDW-SD: 52.1 FML (ref 36–50)
RDW-SD: 53.1 FML (ref 36–50)
RDW-SD: 53.7 FML (ref 36–50)
RDW-SD: 55.3 FML (ref 36–50)
RDW-SD: 55.4 FML (ref 36–50)
RDW-SD: 56.2 FML (ref 36–50)
RDW-SD: 56.3 FML (ref 36–50)
RDW-SD: 57.2 FML (ref 36–50)
RDW-SD: 59 FML (ref 36–50)
RDW-SD: 59.2 FML (ref 36–50)
RDW-SD: 59.5 FML (ref 36–50)
RDW-SD: 59.5 FML (ref 36–50)
RDW-SD: 59.6 FML (ref 36–50)
RDW-SD: 60 FML (ref 36–50)
RDW-SD: 61.4 FML (ref 36–50)
RDW-SD: 62.1 FML (ref 36–50)
RDW-SD: 62.5 FML (ref 36–50)
RDW-SD: 62.8 FML (ref 36–50)
RDW-SD: 63.9 FML (ref 36–50)
RDW-SD: 67 FML (ref 36–50)
RDW-SD: 67.2 FML (ref 36–50)
RDW-SD: 68.2 FML (ref 36–50)
RDW-SD: 68.5 FML (ref 36–50)
RDW-SD: 70.3 FML (ref 36–50)
RDW-SD: 72.1 FML (ref 36–50)
RDW-SD: 72.3 FML (ref 36–50)
RDW-SD: 72.4 FML (ref 36–50)
RDW-SD: 72.6 FML (ref 36–50)
RDW-SD: 74.3 FML (ref 36–50)
RDW-SD: 76.6 FML (ref 36–50)
RDW-SD: 76.9 FML (ref 36–50)
RDW-SD: 77.1 FML (ref 36–50)
RDW-SD: 78.2 FML (ref 36–50)
RDW-SD: 78.5 FML (ref 36–50)
RDW-SD: 80.7 FML (ref 36–50)
SODIUM: 134 MMOL/L (ref 135–146)
SODIUM: 136 MMOL/L (ref 135–146)
SODIUM: 136 MMOL/L (ref 135–146)
SODIUM: 137 MMOL/L (ref 135–146)
SODIUM: 138 MMOL/L (ref 135–146)
SODIUM: 139 MMOL/L
SODIUM: 139 MMOL/L (ref 135–146)
SODIUM: 139 MMOL/L (ref 135–146)
SODIUM: 140 MMOL/L (ref 135–146)
SODIUM: 141 MMOL/L (ref 135–146)
SODIUM: 142 MMOL/L (ref 135–146)
T-3 UPTAKE: 29 % (ref 22–35)
T-3 UPTAKE: 30 % (ref 22–35)
T-3 UPTAKE: 30 % (ref 22–35)
T-3 UPTAKE: 32 % (ref 22–35)
T-4 (THYROXINE), TOTAL: 10.4 MCG/DL (ref 4.9–10.5)
T-4 (THYROXINE), TOTAL: 6.6 MCG/DL (ref 4.9–10.5)
T-4 (THYROXINE), TOTAL: 6.7 MCG/DL (ref 4.9–10.5)
T-4 (THYROXINE), TOTAL: 8.8 MCG/DL (ref 4.9–10.5)
TRIGLYCERIDES: 127 MG/DL
TSH, 3RD GENERATION: 0.83 MIU/L (ref 0.4–4.5)
TSH, 3RD GENERATION: 2.12 MIU/L (ref 0.4–4.5)
TSH, 3RD GENERATION: 2.33 MIU/L (ref 0.4–4.5)
TSH, 3RD GENERATION: 2.36 MIU/L (ref 0.4–4.5)
TSH, 3RD GENERATION: 2.74 MIU/L (ref 0.4–4.5)
UREA NITROGEN (BUN): 12 MG/DL (ref 7–25)
UREA NITROGEN (BUN): 13 MG/DL (ref 7–25)
UREA NITROGEN (BUN): 13 MG/DL (ref 7–25)
UREA NITROGEN (BUN): 15 MG/DL (ref 7–25)
UREA NITROGEN (BUN): 16 MG/DL (ref 7–25)
UREA NITROGEN (BUN): 18 MG/DL (ref 7–25)
UREA NITROGEN (BUN): 18 MG/DL (ref 7–25)
UREA NITROGEN (BUN): 20 MG/DL (ref 7–25)
UREA NITROGEN (BUN): 21 MG/DL (ref 7–25)
UREA NITROGEN (BUN): 22 MG/DL (ref 7–25)
UREA NITROGEN (BUN): 23 MG/DL (ref 7–25)
UREA NITROGEN (BUN): 23 MG/DL (ref 7–25)
UREA NITROGEN (BUN): 24 MG/DL
UREA NITROGEN (BUN): 24 MG/DL (ref 7–25)
UREA NITROGEN (BUN): 24 MG/DL (ref 7–25)
UREA NITROGEN (BUN): 26 MG/DL (ref 7–25)
UREA NITROGEN (BUN): 27 MG/DL (ref 7–25)
UREA NITROGEN (BUN): 28 MG/DL (ref 7–25)
UREA NITROGEN (BUN): 30 MG/DL (ref 7–25)
UREA NITROGEN (BUN): 31 MG/DL (ref 7–25)
UREA NITROGEN (BUN): 34 MG/DL (ref 7–25)
UREA NITROGEN (BUN): 35 MG/DL (ref 7–25)
UREA NITROGEN (BUN): 38 MG/DL (ref 7–25)
UREA NITROGEN (BUN): 43 MG/DL (ref 7–25)
URIC ACID: 7.4 MG/DL (ref 4–8)
VITAMIN B12: 305 PG/ML (ref 200–1100)
VITAMIN D, 25-OH, TOTAL: 32 NG/ML (ref 30–100)
WBC: 0.8 10^3/UL
WBC: 0.8 10^3/UL (ref 4.3–11)
WBC: 1.8 10^3/UL (ref 4.3–11)
WBC: 1.9 10^3/UL (ref 4.3–11)
WBC: 11.2 10^3/UL (ref 4.3–11)
WBC: 12 10^3/UL (ref 4.3–11)
WBC: 12.5 10^3/UL (ref 4.3–11)
WBC: 16.6 10^3/UL (ref 4.3–11)
WBC: 2.6 10^3/UL (ref 4.3–11)
WBC: 2.6 10^3/UL (ref 4.3–11)
WBC: 22.3 10^3/UL (ref 4.3–11)
WBC: 4.3 10^3/UL (ref 4.3–11)
WBC: 4.4 10^3/UL (ref 4.3–11)
WBC: 4.7 10^3/UL (ref 4.3–11)
WBC: 4.8 10^3/UL (ref 4.3–11)
WBC: 4.8 10^3/UL (ref 4.3–11)
WBC: 4.9 10^3/UL (ref 4.3–11)
WBC: 5.1 10^3/UL (ref 4.3–11)
WBC: 5.1 10^3/UL (ref 4.3–11)
WBC: 5.2 10^3/UL (ref 4.3–11)
WBC: 5.4 10^3/UL (ref 4.3–11)
WBC: 5.6 10^3/UL (ref 4.3–11)
WBC: 5.7 10^3/UL (ref 4.3–11)
WBC: 6 10^3/UL (ref 4.3–11)
WBC: 6 10^3/UL (ref 4.3–11)
WBC: 6.1 10^3/UL (ref 4.3–11)
WBC: 6.1 10^3/UL (ref 4.3–11)
WBC: 6.2 10^3/UL (ref 4.3–11)
WBC: 6.5 10^3/UL (ref 4.3–11)
WBC: 6.8 10^3/UL (ref 4.3–11)
WBC: 6.8 10^3/UL (ref 4.3–11)
WBC: 7 10^3/UL (ref 4.3–11)
WBC: 7 10^3/UL (ref 4.3–11)
WBC: 7.1 10^3/UL (ref 4.3–11)
WBC: 7.2 10^3/UL (ref 4.3–11)
WBC: 7.3 10^3/UL (ref 4.3–11)
WBC: 7.5 10^3/UL (ref 4.3–11)
WBC: 7.5 10^3/UL (ref 4.3–11)
WBC: 7.7 10^3/UL (ref 4.3–11)
WBC: 8.1 10^3/UL (ref 4.3–11)
WBC: 8.4 10^3/UL (ref 4.3–11)
WBC: 8.4 10^3/UL (ref 4.3–11)
WBC: 8.6 10^3/UL (ref 4.3–11)
WBC: 9.1 10^3/UL (ref 4.3–11)

## 2020-10-11 VITALS
WEIGHT: 286.09 LBS | SYSTOLIC BLOOD PRESSURE: 128 MMHG | BODY MASS INDEX: 40.47 KG/M2 | DIASTOLIC BLOOD PRESSURE: 74 MMHG

## 2020-10-11 VITALS
BODY MASS INDEX: 36.94 KG/M2 | SYSTOLIC BLOOD PRESSURE: 116 MMHG | DIASTOLIC BLOOD PRESSURE: 60 MMHG | WEIGHT: 280.01 LBS

## 2020-10-11 VITALS
DIASTOLIC BLOOD PRESSURE: 78 MMHG | BODY MASS INDEX: 39.13 KG/M2 | WEIGHT: 289 LBS | WEIGHT: 296.61 LBS | DIASTOLIC BLOOD PRESSURE: 90 MMHG | BODY MASS INDEX: 38.13 KG/M2 | SYSTOLIC BLOOD PRESSURE: 115 MMHG | SYSTOLIC BLOOD PRESSURE: 146 MMHG

## 2020-10-11 VITALS
WEIGHT: 275.99 LBS | HEART RATE: 98 BPM | HEIGHT: 73 IN | BODY MASS INDEX: 36.58 KG/M2 | DIASTOLIC BLOOD PRESSURE: 79 MMHG | SYSTOLIC BLOOD PRESSURE: 105 MMHG

## 2020-10-11 VITALS
SYSTOLIC BLOOD PRESSURE: 154 MMHG | DIASTOLIC BLOOD PRESSURE: 94 MMHG | WEIGHT: 301.59 LBS | BODY MASS INDEX: 42.06 KG/M2

## 2020-10-11 VITALS
WEIGHT: 303.99 LBS | BODY MASS INDEX: 40.29 KG/M2 | HEIGHT: 73 IN | DIASTOLIC BLOOD PRESSURE: 68 MMHG | SYSTOLIC BLOOD PRESSURE: 132 MMHG

## 2020-10-11 VITALS
DIASTOLIC BLOOD PRESSURE: 80 MMHG | WEIGHT: 292 LBS | WEIGHT: 289 LBS | BODY MASS INDEX: 38.52 KG/M2 | BODY MASS INDEX: 38.13 KG/M2 | SYSTOLIC BLOOD PRESSURE: 138 MMHG | BODY MASS INDEX: 37.72 KG/M2 | WEIGHT: 285.89 LBS

## 2020-10-11 VITALS — DIASTOLIC BLOOD PRESSURE: 92 MMHG | BODY MASS INDEX: 37.86 KG/M2 | WEIGHT: 287 LBS | SYSTOLIC BLOOD PRESSURE: 138 MMHG

## 2020-10-11 VITALS
WEIGHT: 270.39 LBS | BODY MASS INDEX: 35.67 KG/M2 | SYSTOLIC BLOOD PRESSURE: 134 MMHG | DIASTOLIC BLOOD PRESSURE: 82 MMHG

## 2020-10-11 VITALS
SYSTOLIC BLOOD PRESSURE: 142 MMHG | BODY MASS INDEX: 38.79 KG/M2 | DIASTOLIC BLOOD PRESSURE: 74 MMHG | WEIGHT: 294.01 LBS

## 2020-10-11 VITALS — BODY MASS INDEX: 40.88 KG/M2 | DIASTOLIC BLOOD PRESSURE: 80 MMHG | SYSTOLIC BLOOD PRESSURE: 138 MMHG | WEIGHT: 289 LBS

## 2020-10-11 VITALS
SYSTOLIC BLOOD PRESSURE: 108 MMHG | HEART RATE: 77 BPM | WEIGHT: 277.01 LBS | BODY MASS INDEX: 36.55 KG/M2 | WEIGHT: 277.4 LBS | BODY MASS INDEX: 36.6 KG/M2 | DIASTOLIC BLOOD PRESSURE: 66 MMHG | HEART RATE: 63 BPM

## 2020-10-11 VITALS
SYSTOLIC BLOOD PRESSURE: 134 MMHG | WEIGHT: 287.39 LBS | DIASTOLIC BLOOD PRESSURE: 76 MMHG | BODY MASS INDEX: 37.92 KG/M2

## 2020-10-11 VITALS — BODY MASS INDEX: 42.01 KG/M2 | SYSTOLIC BLOOD PRESSURE: 148 MMHG | DIASTOLIC BLOOD PRESSURE: 82 MMHG | WEIGHT: 297 LBS

## 2020-10-11 VITALS
SYSTOLIC BLOOD PRESSURE: 149 MMHG | DIASTOLIC BLOOD PRESSURE: 75 MMHG | BODY MASS INDEX: 41.17 KG/M2 | WEIGHT: 291.01 LBS

## 2020-10-11 VITALS
DIASTOLIC BLOOD PRESSURE: 80 MMHG | WEIGHT: 289 LBS | SYSTOLIC BLOOD PRESSURE: 117 MMHG | WEIGHT: 289 LBS | BODY MASS INDEX: 40.03 KG/M2 | BODY MASS INDEX: 40.88 KG/M2 | WEIGHT: 283 LBS | BODY MASS INDEX: 40.88 KG/M2 | SYSTOLIC BLOOD PRESSURE: 121 MMHG | DIASTOLIC BLOOD PRESSURE: 80 MMHG

## 2020-10-11 VITALS — SYSTOLIC BLOOD PRESSURE: 104 MMHG | DIASTOLIC BLOOD PRESSURE: 65 MMHG | WEIGHT: 281 LBS | BODY MASS INDEX: 39.75 KG/M2

## 2020-10-11 VITALS — WEIGHT: 281.99 LBS | DIASTOLIC BLOOD PRESSURE: 70 MMHG | SYSTOLIC BLOOD PRESSURE: 122 MMHG | BODY MASS INDEX: 37.2 KG/M2

## 2020-10-11 VITALS
BODY MASS INDEX: 40.74 KG/M2 | SYSTOLIC BLOOD PRESSURE: 174 MMHG | WEIGHT: 288.01 LBS | DIASTOLIC BLOOD PRESSURE: 95 MMHG

## 2020-10-11 VITALS — WEIGHT: 293.41 LBS | SYSTOLIC BLOOD PRESSURE: 142 MMHG | DIASTOLIC BLOOD PRESSURE: 78 MMHG | BODY MASS INDEX: 41.5 KG/M2

## 2020-10-11 VITALS — WEIGHT: 287 LBS | BODY MASS INDEX: 40.6 KG/M2 | DIASTOLIC BLOOD PRESSURE: 74 MMHG | SYSTOLIC BLOOD PRESSURE: 126 MMHG

## 2020-12-31 ENCOUNTER — PRIOR ORIGINAL RECORDS (OUTPATIENT)
Dept: OTHER | Age: 75
End: 2020-12-31